# Patient Record
Sex: FEMALE | Race: WHITE | Employment: OTHER
[De-identification: names, ages, dates, MRNs, and addresses within clinical notes are randomized per-mention and may not be internally consistent; named-entity substitution may affect disease eponyms.]

---

## 2016-07-19 LAB — PAP SMEAR - HIM PATIENT REPORTED: NORMAL

## 2017-07-08 ENCOUNTER — HEALTH MAINTENANCE LETTER (OUTPATIENT)
Age: 62
End: 2017-07-08

## 2018-08-25 ENCOUNTER — TRANSFERRED RECORDS (OUTPATIENT)
Dept: HEALTH INFORMATION MANAGEMENT | Facility: CLINIC | Age: 63
End: 2018-08-25

## 2019-01-03 ENCOUNTER — OFFICE VISIT (OUTPATIENT)
Dept: FAMILY MEDICINE | Facility: CLINIC | Age: 64
End: 2019-01-03
Payer: COMMERCIAL

## 2019-01-03 VITALS
SYSTOLIC BLOOD PRESSURE: 130 MMHG | HEART RATE: 72 BPM | OXYGEN SATURATION: 99 % | TEMPERATURE: 98.2 F | WEIGHT: 143.6 LBS | DIASTOLIC BLOOD PRESSURE: 70 MMHG

## 2019-01-03 DIAGNOSIS — B02.9 HERPES ZOSTER WITHOUT COMPLICATION: Primary | ICD-10-CM

## 2019-01-03 DIAGNOSIS — H90.2 CONDUCTIVE HEARING LOSS, UNSPECIFIED LATERALITY: ICD-10-CM

## 2019-01-03 PROCEDURE — 99213 OFFICE O/P EST LOW 20 MIN: CPT | Performed by: NURSE PRACTITIONER

## 2019-01-03 RX ORDER — RIBOFLAVIN (VITAMIN B2) 100 MG
1000 TABLET ORAL DAILY
COMMUNITY
Start: 2014-06-30

## 2019-01-03 RX ORDER — DIPHENOXYLATE HYDROCHLORIDE AND ATROPINE SULFATE 2.5; .025 MG/1; MG/1
1 TABLET ORAL DAILY
COMMUNITY
Start: 2017-06-17

## 2019-01-03 RX ORDER — ATORVASTATIN CALCIUM 40 MG/1
20 TABLET, FILM COATED ORAL DAILY
COMMUNITY
Start: 2018-11-29 | End: 2019-06-19

## 2019-01-03 RX ORDER — FLUOXETINE 10 MG/1
10 CAPSULE ORAL EVERY MORNING
COMMUNITY
Start: 2018-08-20 | End: 2019-12-16

## 2019-01-03 RX ORDER — LOSARTAN POTASSIUM 100 MG/1
100 TABLET ORAL DAILY
COMMUNITY
Start: 2018-10-17 | End: 2019-02-08 | Stop reason: ALTCHOICE

## 2019-01-03 RX ORDER — VALACYCLOVIR HYDROCHLORIDE 1 G/1
1000 TABLET, FILM COATED ORAL 3 TIMES DAILY
Qty: 30 TABLET | Refills: 0 | Status: SHIPPED | OUTPATIENT
Start: 2019-01-03 | End: 2019-02-08

## 2019-01-03 RX ORDER — TRIAMTERENE/HYDROCHLOROTHIAZID 37.5-25 MG
1 TABLET ORAL DAILY
Status: ON HOLD | COMMUNITY
Start: 2018-10-17 | End: 2019-02-04

## 2019-01-03 NOTE — PROGRESS NOTES
SUBJECTIVE:   Mery Sullivan is a 63 year old female who presents to clinic today for the following health issues:    Possible Shingles       Duration: 2-3 days     Description (location/character/radiation): Patient states that she has some pain/tingling    Intensity:  moderate    Accompanying signs and symptoms: none    History (similar episodes/previous evaluation): None    Precipitating or alleviating factors: None    Therapies tried and outcome: None         Problem list and histories reviewed & adjusted, as indicated.  Additional history: as documented    Patient Active Problem List   Diagnosis     Essential hypertension, benign     History reviewed. No pertinent surgical history.    Social History     Tobacco Use     Smoking status: Never Smoker     Smokeless tobacco: Never Used   Substance Use Topics     Alcohol use: Yes     Comment: occ     Family History   Problem Relation Age of Onset     Heart Disease Father         CHF     Hypertension Father      Cerebrovascular Disease Mother      Osteoporosis Mother      Cancer - colorectal Maternal Grandmother      Allergies Sister      Obesity Sister      Obesity Brother          Current Outpatient Medications   Medication Sig Dispense Refill     atorvastatin (LIPITOR) 40 MG tablet Take 40 mg by mouth daily       calcium carbonate 600 mg-vitamin D 400 units (CALTRATE) 600-400 MG-UNIT per tablet Take 1 tablet by mouth 2 times daily       FERROUS GLUCONATE 325 MG OR TABS 1 TABLET DAILY       FLUoxetine (PROZAC) 10 MG capsule Take 10 mg by mouth every morning       Inulin 1.5 g CHEW Take 2 tablets by mouth daily       losartan (COZAAR) 100 MG tablet Take 100 mg by mouth daily       Multiple Vitamin (MULTI-VITAMINS) TABS Take 1 tablet by mouth daily       triamterene-HCTZ (MAXZIDE-25) 37.5-25 MG tablet Take 1 tablet by mouth daily       vitamin C (ASCORBIC ACID) 100 MG tablet Take 100 mg by mouth daily       Allergies   Allergen Reactions     Atenolol      Cold hands  and feet     Dyazide [Hydrochlorothiazide W-Triamterene]      Caused cramps     Lisinopril      Worked for blood pressure but over time caused chest discomfort     Sulfa Drugs Rash     fever     No lab results found.   BP Readings from Last 3 Encounters:   01/03/19 130/70   11/07/06 138/94   07/07/06 118/66    Wt Readings from Last 3 Encounters:   01/03/19 65.1 kg (143 lb 9.6 oz)   11/07/06 64.8 kg (142 lb 12.8 oz)   07/07/06 65 kg (143 lb 6.4 oz)                    Reviewed and updated as needed this visit by clinical staff       Reviewed and updated as needed this visit by Provider         ROS:  Constitutional, HEENT, cardiovascular, pulmonary, gi and gu systems are negative, except as otherwise noted.    OBJECTIVE:     /70 (BP Location: Right arm, Patient Position: Sitting, Cuff Size: Adult Regular)   Pulse 72   Temp 98.2  F (36.8  C) (Tympanic)   Wt 65.1 kg (143 lb 9.6 oz)   SpO2 99%   There is no height or weight on file to calculate BMI.  GENERAL: healthy, alert and no distress  EYES: Eyes grossly normal to inspection, PERRL and conjunctivae and sclerae normal  HENT: ear canals and TM's normal, nose and mouth without ulcers or lesions  HENT:   NECK: no adenopathy, no asymmetry, masses, or scars and thyroid normal to palpation  RESP: lungs clear to auscultation - no rales, rhonchi or wheezes  CV: regular rate and rhythm, normal S1 S2, no S3 or S4, no murmur, click or rub, no peripheral edema and peripheral pulses strong  MS: no gross musculoskeletal defects noted, no edema  SKIN: Examination of the rash reveals: Shingles: one   Inflamed patches of clear fluid-filled vesicles   NEURO: Normal strength and tone, mentation intact and speech normal  PSYCH: mentation appears normal, affect normal/bright        ASSESSMENT/PLAN:   (B02.9) Herpes zoster without complication  (primary encounter diagnosis)  Comment: One small lesion noted by GI does have some pain along the dermatome line concerning for  developing shingles will start patient on Valtrex.  With patient in length the patient gets eye pain lesions on the on the eyelid should be seen by ophthalmology any change in her symptoms should return  Plan: valACYclovir (VALTREX) 1000 mg tablet          (H90.2) Conductive hearing loss, unspecified laterality  Comment:   Plan: AUDIOLOGY ADULT REFERRAL, OTOLARYNGOLOGY         REFERRAL     LATESHA Venegas [facility]

## 2019-01-03 NOTE — NURSING NOTE
"Chief Complaint   Patient presents with     Pain       Initial /70 (BP Location: Right arm, Patient Position: Sitting, Cuff Size: Adult Regular)   Pulse 72   Temp 98.2  F (36.8  C) (Tympanic)   Wt 65.1 kg (143 lb 9.6 oz)   SpO2 99%  Estimated body mass index is 24.51 kg/m  as calculated from the following:    Height as of 11/7/06: 1.626 m (5' 4\").    Weight as of 11/7/06: 64.8 kg (142 lb 12.8 oz).    Patient presents to the clinic using No DME    Health Maintenance that is potentially due pending provider review:  Mammogram, Pap Smear and Colonoscopy/FIT    Done at [facility] .    Is there anyone who you would like to be able to receive your results? No  If yes have patient fill out LONG    Kaylan Cameron CMA    "

## 2019-01-03 NOTE — PATIENT INSTRUCTIONS
Your provider has referred you to: [facility] [phone]         Patient Education     Shingles  Shingles is a viral infection caused by the same virus that causes chicken pox. Anyone who has had chicken pox may get shingles later in life. The virus stays in the body, but remains asleep (dormant). Shingles often occurs in older persons or persons with lowered immunity. But it can affect anyone at any age.  Shingles starts as a tingling patch of skin on one side of the body. Small, painful blisters may then appear. The rash rarely spreads to other parts of the body.  Exposure to shingles can't cause shingles. However, it can cause chicken pox in anyone who has not had chicken pox or has not been vaccinated. The contagious period ends when all blisters have crusted over, generally 1 to 2 weeks after the illness starts.  After the blisters heal, the affected skin may be sensitive or painful for weeks or months, gradually resolving over time. But, sometimes this can last longer and be permanent (called postherpetic neuralgia.)  Shingles vaccines are available. Vaccination can help prevent shingles or make it less painful. It is generally recommended for adults older than 50, even if you've had singles in the past. Talk with your healthcare provider about when to get vaccinated and which vaccine is best for you.  Home care    Medicines may be prescribed to help relieve pain. Take these medicines as directed. Ask your healthcare provider or pharmacist before using over-the-counter medicines for helping treat pain and itching.    In certain cases, antiviral medicines may be prescribed to reduce pain, shorten the illness, and prevent neuralgia. Take these medicines as directed.    Compresses made from a solution of cool water mixed with cornstarch or baking soda may help relieve pain and itching.     Gently wash skin daily with soap and water to help prevent infection. Be certain to rinse  off all of the soap, which can be irritating.    Trim fingernails and try not to scratch. Scratching the sores may leave scars.    Stay home from work or school until all blisters have formed a crust and you are no longer contagious.  Follow-up care  Follow up with your healthcare provider, or as directed.  When to seek medical advice    Fever of 100.4 F (38 C) or higher, or as directed by your healthcare provider    Affected skin is on the face or neck and any of the following occur:  ? Headache  ? Eye pain  ? Changes in vision  ? Sores near the eye  ? Weakness of facial muscles    Blisters occurring on new areas of the body    Pain, redness, or swelling of a joint    Signs of skin infection: colored drainage from the sores, warmth, increasing redness, fever, or increasing pain   Date Last Reviewed: 4/1/2018 2000-2018 [employer]. [address] [place] [address]. All rights reserved. This information is not intended as a substitute for professional medical care. Always follow your healthcare professional's instructions.           Patient Education     Shingles (Herpes Zoster)     Talk to your healthcare provider about the shingles vaccine.     Shingles is also called herpes zoster. It is a painful skin rash caused by the herpes zoster virus. This is the same virus that causes chickenpox. After a person has chickenpox, the virus remains inactive in the nerve cells. Years later, the virus can become active again and travel to the skin. Most people have shingles only once, but it is possible to have it more than once.  What are the risk factors for shingles?  Anyone who has ever had chickenpox can develop shingles. But your risk is greater if you:    Are 50 years of age or older    Have an illness that weakens your immune system, such as HIV/AIDS    Have cancer, especially Hodgkin disease or lymphoma    Take medicines that weaken your immune system  What are the symptoms of shingles?    The  first sign of shingles is usually pain, burning, tingling, or itching on one part of your face or body. You may also feel as if you have the flu, with fever and chills.    A red rash with small blisters appears within a few days. The rash may appear as follows:   ? The blisters can occur anywhere, but they re most common on the back, chest, or abdomen.  ? They usually appear on only one side of the body, spreading along the nerve pathway where the virus was inactive.   ? The rash can also form around an eye, along one side of the face or neck, or in the mouth.  ? In a few people, usually those with weakened immune systems, shingles appear on more than one part of the body at once.    After a few days, the blisters become dry and form a crust. The crust falls off in days to weeks. The blisters generally do not leave scars.  How is shingles treated?  For most people, shingles heals on its own in a few weeks. But treatment is recommended to help relieve pain, speed healing, and reduce the risk of complications. Antiviral medicines are prescribed within the first 72 hours of the appearance of the rash. To lessen symptoms:    Apply ice packs (wrapped in a thin towel) or cool compresses, or soak in a cool bath.    Use calamine lotion to calm itchy skin.    Ask your healthcare provider about over-the-counter pain relievers. If your pain is severe, your healthcare provider may prescribe stronger pain medicines.  What are the complications of shingles?  Shingles often goes away with no lasting effects. But some people have serious problems long after the blisters have healed:    Postherpetic neuralgia. This is the most common complication. It is severe nerve pain at the place where the rash used to be. It can last for months, or even years after you have had shingles. Medicines can be prescribed to help relieve the pain and improve quality of life.    Bacterial infection. Shingles blisters may become infected with bacteria.  Antibiotic medicine is used to treat the infection.    Eye problems. A person with shingles on the face should see his or her healthcare provider right away. Shingles can cause serious problems with vision, and even blindness.  Very rarely shingles can also lead to pneumonia, hearing problems, brain inflammation, or even death.   When to seek medical care  Contact your healthcare provider if you experience any of the following:    Symptoms that don t go away with treatment    A rash or blisters near your eye    Increased drainage, fever, or rash after treatment, or severe pain that doesn t go away   How can shingles be prevented?  You can only get shingles if you have had chickenpox in the past. Those who have never had chickenpox can get the virus from you. Although instead of developing shingles, the person may get chickenpox. Until your blisters form scabs, avoid contact with others, especially the following:    Pregnant women who have never had chickenpox or the vaccine    Infants who were born early (prematurely) or who had low weight at birth    People with weak immune system (for example, people receiving chemotherapy for cancer, people who have had organ transplants, or people with HIV infections)     The shingles vaccine  Shingles vaccines are available to help prevent shingles or make it less painful. Vaccination is recommended for adults 50 and older, even if you've had shingles in the past. Talk with your healthcare provider about the most appropriate time for you to get vaccinated, and which vaccine is best for you.   Date Last Reviewed: 10/1/2016    7931-5831 [employer]. [address] [place] [address], [place] [address]. All rights reserved. This information is not intended as a substitute for professional medical care. Always follow your healthcare professional's instructions.

## 2019-02-02 ENCOUNTER — HOSPITAL ENCOUNTER (EMERGENCY)
Facility: CLINIC | Age: 64
Discharge: HOME | End: 2019-02-02
Attending: EMERGENCY MEDICINE | Admitting: EMERGENCY MEDICINE
Payer: COMMERCIAL

## 2019-02-02 ENCOUNTER — ANESTHESIA EVENT (OUTPATIENT)
Dept: EMERGENCY MEDICINE | Facility: CLINIC | Age: 64
End: 2019-02-02
Payer: COMMERCIAL

## 2019-02-02 ENCOUNTER — APPOINTMENT (OUTPATIENT)
Dept: GENERAL RADIOLOGY | Facility: CLINIC | Age: 64
End: 2019-02-02
Payer: COMMERCIAL

## 2019-02-02 ENCOUNTER — ANESTHESIA (OUTPATIENT)
Dept: EMERGENCY MEDICINE | Facility: CLINIC | Age: 64
End: 2019-02-02
Payer: COMMERCIAL

## 2019-02-02 VITALS
SYSTOLIC BLOOD PRESSURE: 135 MMHG | TEMPERATURE: 98.3 F | RESPIRATION RATE: 7 BRPM | HEART RATE: 71 BPM | DIASTOLIC BLOOD PRESSURE: 69 MMHG | OXYGEN SATURATION: 100 %

## 2019-02-02 DIAGNOSIS — S00.33XA CONTUSION OF NOSE, INITIAL ENCOUNTER: ICD-10-CM
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
## 2019-02-02 DIAGNOSIS — R55 NEAR SYNCOPE: ICD-10-CM

## 2019-02-02 DIAGNOSIS — S42.351A CLOSED DISPLACED COMMINUTED FRACTURE OF SHAFT OF RIGHT HUMERUS, INITIAL ENCOUNTER: ICD-10-CM

## 2019-02-02 DIAGNOSIS — S02.2XXA CLOSED FRACTURE OF NASAL BONE, INITIAL ENCOUNTER: ICD-10-CM

## 2019-02-02 LAB
ALBUMIN SERPL-MCNC: 3.5 G/DL (ref 3.4–5)
ALP SERPL-CCNC: 309 U/L (ref 40–150)
ALT SERPL W P-5'-P-CCNC: 88 U/L (ref 0–50)
ANION GAP SERPL CALCULATED.3IONS-SCNC: 6 MMOL/L (ref 3–14)
AST SERPL W P-5'-P-CCNC: 62 U/L (ref 0–45)
BASOPHILS # BLD AUTO: 0 10E9/L (ref 0–0.2)
BASOPHILS NFR BLD AUTO: 0.4 %
BILIRUB SERPL-MCNC: 0.3 MG/DL (ref 0.2–1.3)
BUN SERPL-MCNC: 20 MG/DL (ref 7–30)
CALCIUM SERPL-MCNC: 7.8 MG/DL (ref 8.5–10.1)
CHLORIDE SERPL-SCNC: 103 MMOL/L (ref 94–109)
CO2 SERPL-SCNC: 25 MMOL/L (ref 20–32)
CREAT SERPL-MCNC: 0.82 MG/DL (ref 0.52–1.04)
DIFFERENTIAL METHOD BLD: ABNORMAL
EOSINOPHIL # BLD AUTO: 0.1 10E9/L (ref 0–0.7)
EOSINOPHIL NFR BLD AUTO: 0.5 %
ERYTHROCYTE [DISTWIDTH] IN BLOOD BY AUTOMATED COUNT: 12.3 % (ref 10–15)
GFR SERPL CREATININE-BSD FRML MDRD: 77 ML/MIN/{1.73_M2}
GLUCOSE SERPL-MCNC: 115 MG/DL (ref 70–99)
HCT VFR BLD AUTO: 27.6 % (ref 35–47)
HGB BLD-MCNC: 9.3 G/DL (ref 11.7–15.7)
IMM GRANULOCYTES # BLD: 0 10E9/L (ref 0–0.4)
IMM GRANULOCYTES NFR BLD: 0.3 %
LYMPHOCYTES # BLD AUTO: 1.6 10E9/L (ref 0.8–5.3)
LYMPHOCYTES NFR BLD AUTO: 14.7 %
MCH RBC QN AUTO: 33.2 PG (ref 26.5–33)
MCHC RBC AUTO-ENTMCNC: 33.7 G/DL (ref 31.5–36.5)
MCV RBC AUTO: 99 FL (ref 78–100)
MONOCYTES # BLD AUTO: 0.9 10E9/L (ref 0–1.3)
MONOCYTES NFR BLD AUTO: 7.7 %
NEUTROPHILS # BLD AUTO: 8.6 10E9/L (ref 1.6–8.3)
NEUTROPHILS NFR BLD AUTO: 76.4 %
NRBC # BLD AUTO: 0 10*3/UL
NRBC BLD AUTO-RTO: 0 /100
PLATELET # BLD AUTO: 261 10E9/L (ref 150–450)
POTASSIUM SERPL-SCNC: 3.4 MMOL/L (ref 3.4–5.3)
PROT SERPL-MCNC: 6.2 G/DL (ref 6.8–8.8)
RBC # BLD AUTO: 2.8 10E12/L (ref 3.8–5.2)
SODIUM SERPL-SCNC: 134 MMOL/L (ref 133–144)
TROPONIN I SERPL-MCNC: <0.015 UG/L (ref 0–0.04)
WBC # BLD AUTO: 11.2 10E9/L (ref 4–11)

## 2019-02-02 PROCEDURE — 24505 CLTX HUMRL SHFT FX W/MNPJ: CPT | Mod: RT | Performed by: EMERGENCY MEDICINE

## 2019-02-02 PROCEDURE — 93005 ELECTROCARDIOGRAM TRACING: CPT | Performed by: EMERGENCY MEDICINE

## 2019-02-02 PROCEDURE — 80053 COMPREHEN METABOLIC PANEL: CPT | Performed by: EMERGENCY MEDICINE

## 2019-02-02 PROCEDURE — 99284 EMERGENCY DEPT VISIT MOD MDM: CPT | Mod: 25 | Performed by: EMERGENCY MEDICINE

## 2019-02-02 PROCEDURE — 40000671 ZZH STATISTIC ANESTHESIA CASE

## 2019-02-02 PROCEDURE — 99285 EMERGENCY DEPT VISIT HI MDM: CPT | Mod: 25 | Performed by: EMERGENCY MEDICINE

## 2019-02-02 PROCEDURE — 84484 ASSAY OF TROPONIN QUANT: CPT | Performed by: EMERGENCY MEDICINE

## 2019-02-02 PROCEDURE — 25000128 H RX IP 250 OP 636: Performed by: NURSE ANESTHETIST, CERTIFIED REGISTERED

## 2019-02-02 PROCEDURE — 73060 X-RAY EXAM OF HUMERUS: CPT | Mod: RT

## 2019-02-02 PROCEDURE — 25000132 ZZH RX MED GY IP 250 OP 250 PS 637: Performed by: EMERGENCY MEDICINE

## 2019-02-02 PROCEDURE — 85025 COMPLETE CBC W/AUTO DIFF WBC: CPT | Performed by: EMERGENCY MEDICINE

## 2019-02-02 PROCEDURE — 93010 ELECTROCARDIOGRAM REPORT: CPT | Mod: Z6 | Performed by: EMERGENCY MEDICINE

## 2019-02-02 PROCEDURE — 24505 CLTX HUMRL SHFT FX W/MNPJ: CPT | Mod: 54 | Performed by: EMERGENCY MEDICINE

## 2019-02-02 PROCEDURE — 40000986 XR HUMERUS RT G/E 2 VW: Mod: RT

## 2019-02-02 PROCEDURE — 25000128 H RX IP 250 OP 636: Performed by: EMERGENCY MEDICINE

## 2019-02-02 PROCEDURE — 70160 X-RAY EXAM OF NASAL BONES: CPT

## 2019-02-02 PROCEDURE — 96376 TX/PRO/DX INJ SAME DRUG ADON: CPT | Performed by: EMERGENCY MEDICINE

## 2019-02-02 PROCEDURE — 96374 THER/PROPH/DIAG INJ IV PUSH: CPT | Performed by: EMERGENCY MEDICINE

## 2019-02-02 PROCEDURE — 37000011 ZZH ANESTHESIA WARD SERVICE: Performed by: NURSE ANESTHETIST, CERTIFIED REGISTERED

## 2019-02-02 RX ORDER — OXYCODONE AND ACETAMINOPHEN 5; 325 MG/1; MG/1
1-2 TABLET ORAL EVERY 4 HOURS PRN
Qty: 20 TABLET | Refills: 0 | Status: SHIPPED | OUTPATIENT
Start: 2019-02-02 | End: 2019-02-08

## 2019-02-02 RX ORDER — OXYCODONE AND ACETAMINOPHEN 5; 325 MG/1; MG/1
2 TABLET ORAL ONCE
Status: COMPLETED | OUTPATIENT
Start: 2019-02-02 | End: 2019-02-02

## 2019-02-02 RX ORDER — PROPOFOL 10 MG/ML
INJECTION, EMULSION INTRAVENOUS PRN
Status: DISCONTINUED | OUTPATIENT
Start: 2019-02-02 | End: 2019-02-02

## 2019-02-02 RX ORDER — HYDROMORPHONE HYDROCHLORIDE 1 MG/ML
0.5 INJECTION, SOLUTION INTRAMUSCULAR; INTRAVENOUS; SUBCUTANEOUS ONCE
Status: COMPLETED | OUTPATIENT
Start: 2019-02-02 | End: 2019-02-02

## 2019-02-02 RX ADMIN — HYDROMORPHONE HYDROCHLORIDE 0.5 MG: 1 INJECTION, SOLUTION INTRAMUSCULAR; INTRAVENOUS; SUBCUTANEOUS at 20:51

## 2019-02-02 RX ADMIN — PROPOFOL 50 MG: 10 INJECTION, EMULSION INTRAVENOUS at 21:10

## 2019-02-02 RX ADMIN — OXYCODONE HYDROCHLORIDE AND ACETAMINOPHEN 1 TABLET: 5; 325 TABLET ORAL at 21:44

## 2019-02-02 RX ADMIN — MIDAZOLAM 2 MG: 1 INJECTION INTRAMUSCULAR; INTRAVENOUS at 21:06

## 2019-02-02 RX ADMIN — HYDROMORPHONE HYDROCHLORIDE 0.5 MG: 1 INJECTION, SOLUTION INTRAMUSCULAR; INTRAVENOUS; SUBCUTANEOUS at 20:02

## 2019-02-02 ASSESSMENT — ENCOUNTER SYMPTOMS
NAUSEA: 0
HEADACHES: 0
LIGHT-HEADEDNESS: 1
ABDOMINAL PAIN: 0
MYALGIAS: 1
BACK PAIN: 0

## 2019-02-02 NOTE — ED AVS SNAPSHOT
[facility]  [address] [address]  Phone:  [phone]  Fax:  [phone]                                    Mery Sullivan   MRN: [id number]    Department:  [facility]   Date of Visit:  2/2/2019           After Visit Summary Signature Page    I have received my discharge instructions, and my questions have been answered. I have discussed any challenges I see with this plan with the nurse or doctor.    ..........................................................................................................................................  Patient/Patient Representative Signature      ..........................................................................................................................................  Patient Representative Print Name and Relationship to Patient    ..................................................               ................................................  Date                                   Time    ..........................................................................................................................................  Reviewed by Signature/Title    ...................................................              ..............................................  Date                                               Time          22EPIC Rev 08/18

## 2019-02-03 ENCOUNTER — HOSPITAL ENCOUNTER (OUTPATIENT)
Facility: CLINIC | Age: 64
Setting detail: OBSERVATION
Discharge: HOME | End: 2019-02-04
Attending: EMERGENCY MEDICINE | Admitting: FAMILY MEDICINE
Payer: COMMERCIAL

## 2019-02-03 ENCOUNTER — NURSE TRIAGE (OUTPATIENT)
Dept: NURSING | Facility: CLINIC | Age: 64
End: 2019-02-03

## 2019-02-03 DIAGNOSIS — R55 NEAR SYNCOPE: ICD-10-CM

## 2019-02-03 PROBLEM — R76.11 MANTOUX: POSITIVE, TREATED: Status: ACTIVE | Noted: 2018-08-21

## 2019-02-03 LAB
ANION GAP SERPL CALCULATED.3IONS-SCNC: 5 MMOL/L (ref 3–14)
BASOPHILS # BLD AUTO: 0 10E9/L (ref 0–0.2)
BASOPHILS NFR BLD AUTO: 0.5 %
BUN SERPL-MCNC: 15 MG/DL (ref 7–30)
CALCIUM SERPL-MCNC: 8.3 MG/DL (ref 8.5–10.1)
CHLORIDE SERPL-SCNC: 100 MMOL/L (ref 94–109)
CO2 SERPL-SCNC: 28 MMOL/L (ref 20–32)
CREAT SERPL-MCNC: 0.93 MG/DL (ref 0.52–1.04)
DIFFERENTIAL METHOD BLD: ABNORMAL
EOSINOPHIL # BLD AUTO: 0.1 10E9/L (ref 0–0.7)
EOSINOPHIL NFR BLD AUTO: 1.4 %
ERYTHROCYTE [DISTWIDTH] IN BLOOD BY AUTOMATED COUNT: 12.5 % (ref 10–15)
GFR SERPL CREATININE-BSD FRML MDRD: 66 ML/MIN/{1.73_M2}
GLUCOSE SERPL-MCNC: 116 MG/DL (ref 70–99)
HCT VFR BLD AUTO: 26.6 % (ref 35–47)
HGB BLD-MCNC: 8.8 G/DL (ref 11.7–15.7)
IMM GRANULOCYTES # BLD: 0 10E9/L (ref 0–0.4)
IMM GRANULOCYTES NFR BLD: 0.2 %
LYMPHOCYTES # BLD AUTO: 1.9 10E9/L (ref 0.8–5.3)
LYMPHOCYTES NFR BLD AUTO: 29.7 %
MCH RBC QN AUTO: 33 PG (ref 26.5–33)
MCHC RBC AUTO-ENTMCNC: 33.1 G/DL (ref 31.5–36.5)
MCV RBC AUTO: 100 FL (ref 78–100)
MONOCYTES # BLD AUTO: 0.8 10E9/L (ref 0–1.3)
MONOCYTES NFR BLD AUTO: 12.3 %
NEUTROPHILS # BLD AUTO: 3.6 10E9/L (ref 1.6–8.3)
NEUTROPHILS NFR BLD AUTO: 55.9 %
NRBC # BLD AUTO: 0 10*3/UL
NRBC BLD AUTO-RTO: 0 /100
PLATELET # BLD AUTO: 237 10E9/L (ref 150–450)
POTASSIUM SERPL-SCNC: 3.5 MMOL/L (ref 3.4–5.3)
RBC # BLD AUTO: 2.67 10E12/L (ref 3.8–5.2)
SODIUM SERPL-SCNC: 133 MMOL/L (ref 133–144)
TROPONIN I SERPL-MCNC: <0.015 UG/L (ref 0–0.04)
TSH SERPL DL<=0.005 MIU/L-ACNC: 1.91 MU/L (ref 0.4–4)
WBC # BLD AUTO: 6.4 10E9/L (ref 4–11)

## 2019-02-03 PROCEDURE — G0378 HOSPITAL OBSERVATION PER HR: HCPCS

## 2019-02-03 PROCEDURE — 93010 ELECTROCARDIOGRAM REPORT: CPT | Mod: Z6 | Performed by: EMERGENCY MEDICINE

## 2019-02-03 PROCEDURE — 25000132 ZZH RX MED GY IP 250 OP 250 PS 637: Performed by: EMERGENCY MEDICINE

## 2019-02-03 PROCEDURE — 84443 ASSAY THYROID STIM HORMONE: CPT | Performed by: EMERGENCY MEDICINE

## 2019-02-03 PROCEDURE — 84484 ASSAY OF TROPONIN QUANT: CPT | Performed by: EMERGENCY MEDICINE

## 2019-02-03 PROCEDURE — 85025 COMPLETE CBC W/AUTO DIFF WBC: CPT | Performed by: EMERGENCY MEDICINE

## 2019-02-03 PROCEDURE — 99220 ZZC INITIAL OBSERVATION CARE,LEVL III: CPT | Performed by: PHYSICIAN ASSISTANT

## 2019-02-03 PROCEDURE — 99285 EMERGENCY DEPT VISIT HI MDM: CPT | Mod: 25

## 2019-02-03 PROCEDURE — 80048 BASIC METABOLIC PNL TOTAL CA: CPT | Performed by: EMERGENCY MEDICINE

## 2019-02-03 PROCEDURE — 25000132 ZZH RX MED GY IP 250 OP 250 PS 637: Performed by: PHYSICIAN ASSISTANT

## 2019-02-03 PROCEDURE — 93005 ELECTROCARDIOGRAM TRACING: CPT

## 2019-02-03 PROCEDURE — 99285 EMERGENCY DEPT VISIT HI MDM: CPT | Mod: 25 | Performed by: EMERGENCY MEDICINE

## 2019-02-03 RX ORDER — ATORVASTATIN CALCIUM 20 MG/1
40 TABLET, FILM COATED ORAL EVERY EVENING
Status: DISCONTINUED | OUTPATIENT
Start: 2019-02-04 | End: 2019-02-04 | Stop reason: HOSPADM

## 2019-02-03 RX ORDER — ACETAMINOPHEN 325 MG/1
650 TABLET ORAL EVERY 4 HOURS PRN
Status: DISCONTINUED | OUTPATIENT
Start: 2019-02-03 | End: 2019-02-04 | Stop reason: HOSPADM

## 2019-02-03 RX ORDER — LIDOCAINE 40 MG/G
CREAM TOPICAL
Status: DISCONTINUED | OUTPATIENT
Start: 2019-02-03 | End: 2019-02-04 | Stop reason: HOSPADM

## 2019-02-03 RX ORDER — IBUPROFEN 600 MG/1
600 TABLET, FILM COATED ORAL EVERY 6 HOURS PRN
Status: DISCONTINUED | OUTPATIENT
Start: 2019-02-03 | End: 2019-02-04 | Stop reason: HOSPADM

## 2019-02-03 RX ORDER — LOSARTAN POTASSIUM 50 MG/1
100 TABLET ORAL DAILY
Status: DISCONTINUED | OUTPATIENT
Start: 2019-02-04 | End: 2019-02-04 | Stop reason: HOSPADM

## 2019-02-03 RX ORDER — OXYCODONE HYDROCHLORIDE 5 MG/1
5 TABLET ORAL EVERY 4 HOURS PRN
Status: DISCONTINUED | OUTPATIENT
Start: 2019-02-03 | End: 2019-02-04 | Stop reason: HOSPADM

## 2019-02-03 RX ORDER — PROCHLORPERAZINE MALEATE 10 MG
10 TABLET ORAL EVERY 6 HOURS PRN
Status: DISCONTINUED | OUTPATIENT
Start: 2019-02-03 | End: 2019-02-04 | Stop reason: HOSPADM

## 2019-02-03 RX ORDER — FLUOXETINE 10 MG/1
10 CAPSULE ORAL EVERY MORNING
Status: DISCONTINUED | OUTPATIENT
Start: 2019-02-04 | End: 2019-02-04 | Stop reason: HOSPADM

## 2019-02-03 RX ORDER — ONDANSETRON 2 MG/ML
4 INJECTION INTRAMUSCULAR; INTRAVENOUS EVERY 6 HOURS PRN
Status: DISCONTINUED | OUTPATIENT
Start: 2019-02-03 | End: 2019-02-04 | Stop reason: HOSPADM

## 2019-02-03 RX ORDER — NALOXONE HYDROCHLORIDE 0.4 MG/ML
.1-.4 INJECTION, SOLUTION INTRAMUSCULAR; INTRAVENOUS; SUBCUTANEOUS
Status: DISCONTINUED | OUTPATIENT
Start: 2019-02-03 | End: 2019-02-04 | Stop reason: HOSPADM

## 2019-02-03 RX ORDER — PROCHLORPERAZINE 25 MG
25 SUPPOSITORY, RECTAL RECTAL EVERY 12 HOURS PRN
Status: DISCONTINUED | OUTPATIENT
Start: 2019-02-03 | End: 2019-02-04 | Stop reason: HOSPADM

## 2019-02-03 RX ORDER — ONDANSETRON 4 MG/1
4 TABLET, ORALLY DISINTEGRATING ORAL EVERY 6 HOURS PRN
Status: DISCONTINUED | OUTPATIENT
Start: 2019-02-03 | End: 2019-02-04 | Stop reason: HOSPADM

## 2019-02-03 RX ORDER — IBUPROFEN 600 MG/1
600 TABLET, FILM COATED ORAL ONCE
Status: DISCONTINUED | OUTPATIENT
Start: 2019-02-03 | End: 2019-02-03

## 2019-02-03 RX ADMIN — ACETAMINOPHEN 650 MG: 325 TABLET, FILM COATED ORAL at 21:42

## 2019-02-03 RX ADMIN — IBUPROFEN 600 MG: 400 TABLET ORAL at 18:00

## 2019-02-03 RX ADMIN — OXYCODONE HYDROCHLORIDE 5 MG: 5 TABLET ORAL at 21:42

## 2019-02-03 NOTE — ED PROVIDER NOTES
History     Chief Complaint   Patient presents with     Hypotension     pt c/o dizziness and low blood pressure.  pt feels it is medication related.  no cp or soa.  no recent illness or fevers     HPI  Mery Sullivan is a 63 year old female who presents for near syncope.  Patient reports that over the past 2 days she has had 2 episodes of lightheadedness.  Both episodes have came on suddenly.  Yesterday she was standing and fell and ended up having a fracture of her humerus.  Today it started when she was sitting.  She denies any symptoms currently.  At home just prior to arrival this came on very abruptly, they took her blood pressure and was 64/40, and then it resolved on its own without intervention, but she still feels slightly light headed now.  They then brought her here for further evaluation.  She reports that she feels fine now.  She denies fever, chest pain, difficulty breathing, abdominal pain, vomiting, diarrhea, dysuria, or rash.  No recent changes in her medication.  She says she has still been drinking and eating normally.  No recent weight loss.  She denies any blood in her stools.  She has had issues with anemia and is on chronic iron for this.  She started taking triamterene hydrochlorothiazide 4 months ago but stopped taking that last night after this episode.  She says she did not take this this morning and instead took an older prescription of losartan-hydrochlorothiazide.    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     Priority: Medium     Generalized anxiety disorder 06/26/2013     Priority: Medium     Hyperlipidemia 06/26/2013     Priority: Medium      Dysthymic disorder 05/29/2012     Priority: Medium     Essential hypertension, benign 07/07/2006     Priority: Medium     Dyazide gave leg cramps  Lisinopril caused chest discomfort  Atenolol caused cold hands but was effective for BP at low dose  ARBs were only available by brand and pt requests generic for cost so started on calcium channel blocker 11/06          Past Medical History:    Past Medical History:   Diagnosis Date     Anemia, unspecified      Unspecified essential hypertension        Past Surgical History:    No past surgical history on file.    Family History:    Family History   Problem Relation Age of Onset     Heart Disease Father         CHF     Hypertension Father      Cerebrovascular Disease Mother      Osteoporosis Mother      Cancer - colorectal Maternal Grandmother      Allergies Sister      Obesity Sister      Obesity Brother        Social History:  Marital Status:   [2]  Social History     Tobacco Use     Smoking status: Never Smoker     Smokeless tobacco: Never Used   Substance Use Topics     Alcohol use: Yes     Comment: occ     Drug use: No        Medications:      atorvastatin (LIPITOR) 40 MG tablet   calcium carbonate 600 mg-vitamin D 400 units (CALTRATE) 600-400 MG-UNIT per tablet   FERROUS GLUCONATE 325 MG OR TABS   FLUoxetine (PROZAC) 10 MG capsule   Inulin 1.5 g CHEW   losartan (COZAAR) 100 MG tablet   Multiple Vitamin (MULTI-VITAMINS) TABS   oxyCODONE-acetaminophen (PERCOCET) 5-325 MG tablet   triamterene-HCTZ (MAXZIDE-25) 37.5-25 MG tablet   valACYclovir (VALTREX) 1000 mg tablet   vitamin C (ASCORBIC ACID) 100 MG tablet         Review of Systems  Pertinent positives and negatives listed in the HPI, all other systems reviewed and are negative.    Physical Exam   BP: 114/90  Pulse: 90  Heart Rate: 89  Resp: 14  SpO2: 100 %      Physical Exam   Constitutional: She is oriented to person, place, and time. She appears well-developed and well-nourished. She appears distressed.    HENT:   Head: Normocephalic and atraumatic.   Right Ear: External ear normal.   Left Ear: External ear normal.   Nose: Nose normal.   Eyes: Conjunctivae are normal. No scleral icterus.   Neck: Normal range of motion.   Cardiovascular: Normal rate and regular rhythm.   Pulmonary/Chest: Effort normal. No stridor. No respiratory distress.   Abdominal: Soft. She exhibits no distension.   Musculoskeletal:   Splint on right upper arm with swelling of the right hand.   Neurological: She is alert and oriented to person, place, and time.   Skin: Skin is warm and dry. She is not diaphoretic.   Psychiatric: She has a normal mood and affect. Her behavior is normal.   Nursing note and vitals reviewed.      ED Course        Procedures               EKG Interpretation:      Interpreted by Kennedy Lozano  Time reviewed: 1710  Symptoms at time of EKG: Near syncope   Rhythm: normal sinus   Rate: normal  Axis: normal  Ectopy: none  Conduction: normal  ST Segments/ T Waves: No ST-T wave changes  Q Waves: none  Comparison to prior: Unchanged     Clinical Impression: normal EKG          Critical Care time:  none               Results for orders placed or performed during the hospital encounter of 02/03/19 (from the past 24 hour(s))   Troponin I   Result Value Ref Range    Troponin I ES <0.015 0.000 - 0.045 ug/L   Basic metabolic panel   Result Value Ref Range    Sodium 133 133 - 144 mmol/L    Potassium 3.5 3.4 - 5.3 mmol/L    Chloride 100 94 - 109 mmol/L    Carbon Dioxide 28 20 - 32 mmol/L    Anion Gap 5 3 - 14 mmol/L    Glucose 116 (H) 70 - 99 mg/dL    Urea Nitrogen 15 7 - 30 mg/dL    Creatinine 0.93 0.52 - 1.04 mg/dL    GFR Estimate 66 >60 mL/min/[1.73_m2]    GFR Estimate If Black 77 >60 mL/min/[1.73_m2]    Calcium 8.3 (L) 8.5 - 10.1 mg/dL   CBC with platelets differential   Result Value Ref Range    WBC 6.4 4.0 - 11.0 10e9/L    RBC Count 2.67 (L) 3.8 - 5.2 10e12/L    Hemoglobin 8.8 (L) 11.7 - 15.7 g/dL    Hematocrit 26.6 (L)  35.0 - 47.0 %     78 - 100 fl    MCH 33.0 26.5 - 33.0 pg    MCHC 33.1 31.5 - 36.5 g/dL    RDW 12.5 10.0 - 15.0 %    Platelet Count 237 150 - 450 10e9/L    Diff Method Automated Method     % Neutrophils 55.9 %    % Lymphocytes 29.7 %    % Monocytes 12.3 %    % Eosinophils 1.4 %    % Basophils 0.5 %    % Immature Granulocytes 0.2 %    Nucleated RBCs 0 0 /100    Absolute Neutrophil 3.6 1.6 - 8.3 10e9/L    Absolute Lymphocytes 1.9 0.8 - 5.3 10e9/L    Absolute Monocytes 0.8 0.0 - 1.3 10e9/L    Absolute Eosinophils 0.1 0.0 - 0.7 10e9/L    Absolute Basophils 0.0 0.0 - 0.2 10e9/L    Abs Immature Granulocytes 0.0 0 - 0.4 10e9/L    Absolute Nucleated RBC 0.0    TSH with free T4 reflex   Result Value Ref Range    TSH 1.91 0.40 - 4.00 mU/L       Medications   ibuprofen (ADVIL/MOTRIN) tablet 600 mg (600 mg Oral Incomplete 2/3/19 1800)       Assessments & Plan (with Medical Decision Making)   63-year-old female who presents for near syncope and hypotension at home.  Heart rate 79, blood pressure is 123/60, SPO2 is 100% on room air.  She is feeling much better now.  Symptoms were transient.  EKG shows sinus rhythm without signs of dysrhythmia such as WPW, prolonged QT syndrome, or arrhythmogenic right ventricular dysplasia.  She is feeling better now.  Hemoglobin is 8.8, which is low but this is not significantly different from yesterday's value of 9.3.  TSH is normal.  Troponin is normal.  Electrolytes are within normal limits.  The patient remained stable, however with 2 episodes over the past 24 hours it is prudent to admit the patient to the hospital here for close monitoring.  I discussed the case with the on-call hospitalist Taina JUAREZ who agrees to admit the patient to her service.    I have reviewed the nursing notes.    I have reviewed the findings, diagnosis, plan and need for follow up with the patient.          Medication List      There are no discharge medications for this visit.         Final  diagnoses:   Near syncope       2/3/2019   [facility] EMERGENCY DEPARTMENT     Kennedy Lozano MD  02/03/19 0510

## 2019-02-03 NOTE — ED PROVIDER NOTES
"  History     Chief Complaint   Patient presents with     Fall     right Shoulder & Upper arm  injury  Patient  B/G 140   recent change in B/P medications  Patient having  No LOC  CMS right arm  good   20 ga IV        HPI  Mery Sullivan is a 63 year old female who presents to the ED via EMS for evaluation after a fall. The patient states that earlier this evening, she was walking into the bathroom when she experienced an acute onset of lightheadedness. Per her , the patient experienced LOC and fell forward, hitting the wall, and then backward, hitting her right arm on the bathtub. He adds that her eyelids were \"fluttering\" and that she was unresponsive, though she reports that she could hear him. The patient states that she woke up on the tile floor unable to move her right arm. She denies any chest pain or shortness of breath prior to LOC. The patient currently describes a sharp pain in her upper right arm which worsens with movement. No right elbow pain, back pain, chest pain, abdominal pain, leg pain, headache, or nausea. The patient has a drug allergy to sulfas.  Is right-hand dominant.  Denies paresthesias.  Denies previous history of cardiac disease or syncope.      Allergies:  Allergies   Allergen Reactions     Atenolol      Cold hands and feet     Dyazide [Hydrochlorothiazide W-Triamterene]      Caused cramps     Lisinopril      Worked for blood pressure but over time caused chest discomfort     Sulfa Drugs Rash     fever       Problem List:    Patient Active Problem List    Diagnosis Date Noted     Essential hypertension, benign 07/07/2006     Priority: Medium     Dyazide gave leg cramps  Lisinopril caused chest discomfort  Atenolol caused cold hands but was effective for BP at low dose  ARBs were only available by brand and pt requests generic for cost so started on calcium channel blocker 11/06          Past Medical History:    Past Medical History:   Diagnosis Date     Anemia, unspecified      " Unspecified essential hypertension        Past Surgical History:    No past surgical history on file.    Family History:    Family History   Problem Relation Age of Onset     Heart Disease Father         CHF     Hypertension Father      Cerebrovascular Disease Mother      Osteoporosis Mother      Cancer - colorectal Maternal Grandmother      Allergies Sister      Obesity Sister      Obesity Brother        Social History:  Marital Status:   [2]  Social History     Tobacco Use     Smoking status: Never Smoker     Smokeless tobacco: Never Used   Substance Use Topics     Alcohol use: Yes     Comment: occ     Drug use: No        Medications:      atorvastatin (LIPITOR) 40 MG tablet   calcium carbonate 600 mg-vitamin D 400 units (CALTRATE) 600-400 MG-UNIT per tablet   FERROUS GLUCONATE 325 MG OR TABS   FLUoxetine (PROZAC) 10 MG capsule   Inulin 1.5 g CHEW   losartan (COZAAR) 100 MG tablet   oxyCODONE-acetaminophen (PERCOCET) 5-325 MG tablet   triamterene-HCTZ (MAXZIDE-25) 37.5-25 MG tablet   vitamin C (ASCORBIC ACID) 100 MG tablet   Multiple Vitamin (MULTI-VITAMINS) TABS   valACYclovir (VALTREX) 1000 mg tablet         Review of Systems   Cardiovascular: Negative for chest pain.   Gastrointestinal: Negative for abdominal pain and nausea.   Musculoskeletal: Positive for myalgias. Negative for back pain.   Neurological: Positive for light-headedness. Negative for headaches.   All other systems reviewed and are negative.      Physical Exam   BP: 119/63  Pulse: 77  Heart Rate: 82  Temp: 98.3  F (36.8  C)  Resp: 18  SpO2: 98 %      Physical Exam general alert cooperative female in moderate distress.  HEENT reveals bruising and swelling at the base of her nose.  Pupils are equal and reactive.  Extraocular motions are intact.  No persistent nystagmus.  Ears reveal no hemotympanum and there is no graham signs.  No epistasis currently but did have a nosebleed at home.  Dried blood in the left nares.  No septal hematoma.  No  dental trauma malocclusion.  Neck and back are nontender.  Lungs are clear without adventitious sounds.  Cardiac auscultation is normal without murmur.  Her chest and abdomen are nontender.  She has her right arm extended above her head and on palpation there is crepitus and tenderness in the mid humeral area.  The elbow is nontender.  She has intact pulses.  Intact  strength.  Cap refill is normal.  The other extremities are atraumatic.  [place] Coma Scale was 15 except she was unable to move her right upper arm due to the pain.    ED Course        Procedures        X-ray was obtained and showed a displaced midshaft humeral fracture.  With the assistance of anesthetist patient was sedated after consent was signed by her .  Positive the cause in the right arm was identified as the offending arm.  Then the arm was repositioned and distraction at the elbow.  The arm was splinted in sugar tong fashion and thereafter had intact pulses and cap refill.  Repeat x-ray showed improved alignment.          EKG Interpretation:      Interpreted by Nick Frederick  Time reviewed:21:25  Symptoms at time of EKG: Syncopal episode  Rhythm: Normal sinus   Rate: Normal  Axis: Normal  Ectopy: None  Conduction: Normal  ST Segments/ T Waves: No ST-T wave changes and No acute ischemic changes  Q Waves: None  Comparison to prior: No old EKG available    Clinical Impression: normal EKG    Critical Care time:  none               Results for orders placed or performed during the hospital encounter of 02/02/19 (from the past 24 hour(s))   XR Humerus Port Right G/E 2 Views    Narrative    HUMERUS TWO VIEWS RIGHT  2/2/2019 8:23 PM     HISTORY: Fall with humeral injury    COMPARISON: None.      Impression    IMPRESSION: Oblique displaced fracture of the diaphysis of the humerus  at the junction of proximal one third and distal two thirds. There is  a tiny additional fragment, oblique nondisplaced fracture in the  proximal humerus fragment  extending towards the humeral neck.  Posterior displacement of the distal fragment with angulation.    MILAGRO BEAR MD   CBC with platelets, differential   Result Value Ref Range    WBC 11.2 (H) 4.0 - 11.0 10e9/L    RBC Count 2.80 (L) 3.8 - 5.2 10e12/L    Hemoglobin 9.3 (L) 11.7 - 15.7 g/dL    Hematocrit 27.6 (L) 35.0 - 47.0 %    MCV 99 78 - 100 fl    MCH 33.2 (H) 26.5 - 33.0 pg    MCHC 33.7 31.5 - 36.5 g/dL    RDW 12.3 10.0 - 15.0 %    Platelet Count 261 150 - 450 10e9/L    Diff Method Automated Method     % Neutrophils 76.4 %    % Lymphocytes 14.7 %    % Monocytes 7.7 %    % Eosinophils 0.5 %    % Basophils 0.4 %    % Immature Granulocytes 0.3 %    Nucleated RBCs 0 0 /100    Absolute Neutrophil 8.6 (H) 1.6 - 8.3 10e9/L    Absolute Lymphocytes 1.6 0.8 - 5.3 10e9/L    Absolute Monocytes 0.9 0.0 - 1.3 10e9/L    Absolute Eosinophils 0.1 0.0 - 0.7 10e9/L    Absolute Basophils 0.0 0.0 - 0.2 10e9/L    Abs Immature Granulocytes 0.0 0 - 0.4 10e9/L    Absolute Nucleated RBC 0.0    Comprehensive metabolic panel   Result Value Ref Range    Sodium 134 133 - 144 mmol/L    Potassium 3.4 3.4 - 5.3 mmol/L    Chloride 103 94 - 109 mmol/L    Carbon Dioxide 25 20 - 32 mmol/L    Anion Gap 6 3 - 14 mmol/L    Glucose 115 (H) 70 - 99 mg/dL    Urea Nitrogen 20 7 - 30 mg/dL    Creatinine 0.82 0.52 - 1.04 mg/dL    GFR Estimate 77 >60 mL/min/[1.73_m2]    GFR Estimate If Black 89 >60 mL/min/[1.73_m2]    Calcium 7.8 (L) 8.5 - 10.1 mg/dL    Bilirubin Total 0.3 0.2 - 1.3 mg/dL    Albumin 3.5 3.4 - 5.0 g/dL    Protein Total 6.2 (L) 6.8 - 8.8 g/dL    Alkaline Phosphatase 309 (H) 40 - 150 U/L    ALT 88 (H) 0 - 50 U/L    AST 62 (H) 0 - 45 U/L   Troponin I   Result Value Ref Range    Troponin I ES <0.015 0.000 - 0.045 ug/L   XR Humerus Right G/E 2 Views    Narrative    HUMERUS TWO VIEWS RIGHT  2/2/2019 10:27 PM     HISTORY: Reduction    COMPARISON: Right humerus 2/2/2019 2004 hours      Impression    IMPRESSION: Oblique fracture through the  "proximal diaphysis of the  humerus is redemonstrated. Previous angulation is resolved. There is  mild medial displacement of the distal fragment in the frontal  projection.    MILAGRO BEAR MD   XR Nasal Bones 3 Views    Narrative    X-RAY NASAL BONES 3 VIEWS 2/2/2019 10:30 PM    HISTORY: Fall with nasal trauma and epistasis    COMPARISON: None      Impression    IMPRESSION:   Nondisplaced nasal bone fracture.    MILAGRO BEAR MD       Medications   HYDROmorphone (PF) (DILAUDID) injection 0.5 mg (0.5 mg Intravenous Given 2/2/19 2002)   HYDROmorphone (PF) (DILAUDID) injection 0.5 mg (0.5 mg Intravenous Given 2/2/19 2051)   oxyCODONE-acetaminophen (PERCOCET) 5-325 MG per tablet 2 tablet (1 tablet Oral Given 2/2/19 2144)       7:55 PM Patient assessed.    IV was established and blood work was obtained.  EKG is ordered.  X-ray of the arm is ordered and showed displaced fracture noted above.  She was given Dilaudid for pain prior to x-ray.  With the assistance of anesthesia a reduction was performed as noted above.  Please see their note.  Assessments & Plan (with Medical Decision Making)   Mery Sullivan is a 63 year old female who presents to the ED via EMS for evaluation after a fall. The patient states that earlier this evening, she was walking into the bathroom when she experienced an acute onset of lightheadedness. Per her , the patient experienced LOC and fell forward, hitting the wall, and then backward, hitting her right arm on the bathtub. He adds that her eyelids were \"fluttering\" and that she was unresponsive, though she reports that she could hear him. The patient states that she woke up on the tile floor unable to move her right arm. She denies any chest pain or shortness of breath prior to LOC. The patient currently describes a sharp pain in her upper right arm which worsens with movement. No right elbow pain, back pain, chest pain, abdominal pain, leg pain, headache, or nausea. The patient has a " drug allergy to sulfas.  Is right-hand dominant.  Denies paresthesias.  Denies previous history of cardiac disease or syncope.  On presentation patient was in moderate pain.  She had her right arm lifted above her head and refuses to move due to discomfort.  Head and neck were atraumatic except nasal bruising and swelling with dried blood in the nares.  No hemotympanum or graham sign.  No raccoon's eyes.  Pupils were equal and react light accommodation.  Extraocular motions are intact.  Neck and back are nontender.  Chest and abdomen nontender.  Palpation of her humerus there was crepitus and deformity of the mid humerus.  Elbow and wrist were unaffected.  Intact pulses and sensation distally.  She had normal respiratory and cardiac auscultation.  EKG showed no acute abnormality.  No old EKG for comparison.  On cardiac monitoring she had no ectopy or problems throughout the ER stay.  Blood work was obtained as noted above.  She had a displaced midshaft humeral fracture.  With the assistance of anesthesia for sedation this was reduced and re-x-ray showed improved alignment..  Nasal x-ray showed nondisplaced fracture.  No air-fluid levels in the sinus that would can concerning for blood.  Orthopedic referral is provided.  Ibuprofen or Percocet for pain.  Sling and splint until follow-up.  Handout on humeral fracture and on syncope are provided.  Reasons to return to the emergency room are discussed.  I have reviewed the nursing notes.    I have reviewed the findings, diagnosis, plan and need for follow up with the patient.          Medication List      Started    oxyCODONE-acetaminophen 5-325 MG tablet  Commonly known as:  PERCOCET  1-2 tablets, Oral, EVERY 4 HOURS PRN            Final diagnoses:   Fall, initial encounter   Closed displaced comminuted fracture of shaft of right humerus, initial encounter   Contusion of nose, initial encounter   Near syncope   Closed fracture of nasal bone, initial encounter       This  document serves as a record of the services and decisions personally performed and made by Nick Frederick MD. It was created on HIS/HER behalf by Isa Reyes, a trained medical scribe. The creation of this document is based the provider's statements to the medical scribe.  Isa Reyes 8:25 PM 2/2/2019    Provider:   The information in this document, created by the medical scribe for me, accurately reflects the services I personally performed and the decisions made by me. I have reviewed and approved this document for accuracy prior to leaving the patient care area.  Nick Frederick MD 8:25 PM 2/2/2019 2/2/2019   [facility] EMERGENCY DEPARTMENT     Nick Frederick MD  02/02/19 2242       Nick Frederick MD  02/02/19 2247

## 2019-02-03 NOTE — ED NOTES
Post splint and sedation. pt is awake and talking. Sling applied. VSS. Will monitor. Family At   bedside.

## 2019-02-03 NOTE — ED TRIAGE NOTES
Patient walking into bathroom with laundry & got dizzy/lightheaded & fell sideways into wall & went backwards & hit tub and floor. Per  she was slow to respond, eyes fluttering for a few seconds before she came too. Arrives with dried blood to left nostril, denies facial/nose pain. Has severe pain to upper right arm with any movement, + bruising, cms intact. States her MD has been changing her BP meds and she has felt dizzy for past few weeks since then. Med list updated

## 2019-02-03 NOTE — ED TRIAGE NOTES
pt c/o dizziness and low blood pressure.  pt feels it is medication related.  no cp or soa.  no recent illness or fevers.  Pt has had recent change in BP meds.

## 2019-02-03 NOTE — DISCHARGE INSTRUCTIONS
The orthopedic clinic should contact you to arrange follow-up with the orthopedic surgeon.  Ice for swelling.  Splint and sling until follow-up.  Ibuprofen or Percocet for pain.  If uncontrolled pain, discoloration of the hand, or increased weakness of the hand return to the emergency room for reassessment.

## 2019-02-03 NOTE — ANESTHESIA POSTPROCEDURE EVALUATION
Patient: Mery Sullivan    * No procedures listed *    Diagnosis:* No pre-op diagnosis entered *  Diagnosis Additional Information: No value filed.    Anesthesia Type:  MAC    Note:  Anesthesia Post Evaluation    Patient location during evaluation: ED  Patient participation: Able to fully participate in evaluation  Level of consciousness: awake  Pain management: adequate  Airway patency: patent  Cardiovascular status: acceptable  Respiratory status: acceptable  Hydration status: stable  PONV: none     Anesthetic complications: None          Last vitals:  Vitals:    02/02/19 2030 02/02/19 2045 02/02/19 2100   BP: 122/70 124/67 120/65   Pulse: 70 75 81   Resp: 9 10 10   Temp:      SpO2: 100% 100% 100%         Electronically Signed By: LATESHA Contreras CRNA  February 2, 2019  9:16 PM

## 2019-02-03 NOTE — TELEPHONE ENCOUNTER
"Spouse calling; patient present.  Patient at ED yesterday and treated for right upper arm fracture after a fall.  Spouse states patient's blood pressure keeps dropping and is now 66/42.  Spouse handed phone to patient who states she was lightheaded earlier.  States has adjusted doses of some of her medications.  Advised to go to ED now.  Patient questioning if low blood pressure is due to medications peaking.  FNA again recommended ED visit now.     Reason for Disposition    [1] Systolic BP < 80 AND [2] NOT dizzy, lightheaded or weak    Additional Information    Negative: Started suddenly after an allergic medicine, an allergic food, or bee sting    Negative: Shock suspected (e.g., cold/pale/clammy skin, too weak to stand, low BP, rapid pulse)    Negative: Difficult to awaken or acting confused  (e.g., disoriented, slurred speech)    Negative: Fainted    Negative: [1] Systolic BP < 90 AND [2] dizzy, lightheaded, or weak    Negative: Chest pain    Negative: Bleeding (e.g., vomiting blood, rectal bleeding or tarry stools, severe vaginal bleeding)(Exception: fainted from sight of small amount of blood; small cut or abrasion)    Negative: Extra heart beats or heart is beating fast  (i.e., \"palpitations\")    Negative: Sounds like a life-threatening emergency to the triager    Protocols used: LOW BLOOD PRESSURE-ADULT-AH      "

## 2019-02-03 NOTE — ANESTHESIA PREPROCEDURE EVALUATION
"Anesthesia Pre-Procedure Evaluation    Patient: Mery Sullivan   MRN: [id number] : 1955          Preoperative Diagnosis: * No surgery found *        Past Medical History:   Diagnosis Date     Anemia, unspecified      Unspecified essential hypertension      No past surgical history on file.    Anesthesia Evaluation     .             ROS/MED HX    ENT/Pulmonary:  - neg pulmonary ROS     Neurologic:  - neg neurologic ROS     Cardiovascular:     (+) Dyslipidemia, hypertension----. : . . . :. .       METS/Exercise Tolerance:     Hematologic:  - neg hematologic  ROS       Musculoskeletal:  - neg musculoskeletal ROS       GI/Hepatic:  - neg GI/hepatic ROS       Renal/Genitourinary:  - ROS Renal section negative       Endo:  - neg endo ROS       Psychiatric:         Infectious Disease:  - neg infectious disease ROS       Malignancy:      - no malignancy   Other:    - neg other ROS                      Physical Exam  Normal systems: cardiovascular, pulmonary and dental    Airway   Mallampati: II  TM distance: >3 FB  Neck ROM: full    Dental     Cardiovascular       Pulmonary             Lab Results   Component Value Date    WBC 4.7 2005    HGB 12.7 2006    HCT 33.7 (L) 2005     2005     2004    POTASSIUM 4.5 2004    CHLORIDE 107 2004    CO2 27 2004    BUN 20 2004    CR 0.97 2004    GLC 90 2004    RADHA 8.7 2004    TSH 1.48 2004       Preop Vitals  BP Readings from Last 3 Encounters:   19 104/67   19 130/70   06 138/94    Pulse Readings from Last 3 Encounters:   19 68   19 72   06 84      Resp Readings from Last 3 Encounters:   19 9   06 16    SpO2 Readings from Last 3 Encounters:   19 95%   19 99%      Temp Readings from Last 1 Encounters:   19 36.8  C (98.3  F) (Oral)    Ht Readings from Last 1 Encounters:   06 1.626 m (5' 4\")      Wt Readings from Last 1 " "Encounters:   01/03/19 65.1 kg (143 lb 9.6 oz)    Estimated body mass index is 24.51 kg/m  as calculated from the following:    Height as of 11/7/06: 1.626 m (5' 4\").    Weight as of 11/7/06: 64.8 kg (142 lb 12.8 oz).       Anesthesia Plan      History & Physical Review  History and physical reviewed and following examination; no interval change.    ASA Status:  2 .    NPO Status:  > 4 hours    Plan for MAC Reason for MAC:  Other - see comments  PONV prophylaxis:  Ondansetron (or other 5HT-3) and Dexamethasone or Solumedrol       Postoperative Care  Postoperative pain management:  IV analgesics and Oral pain medications.      Consents  Anesthetic plan, risks, benefits and alternatives discussed with:  Patient..                 LATESHA Contreras CRNA  "

## 2019-02-04 ENCOUNTER — HOSPITAL ENCOUNTER (OUTPATIENT)
Dept: CARDIOLOGY | Facility: CLINIC | Age: 64
End: 2019-02-04
Attending: NURSE PRACTITIONER
Payer: COMMERCIAL

## 2019-02-04 VITALS
OXYGEN SATURATION: 99 % | SYSTOLIC BLOOD PRESSURE: 122 MMHG | RESPIRATION RATE: 18 BRPM | DIASTOLIC BLOOD PRESSURE: 68 MMHG | TEMPERATURE: 98 F | WEIGHT: 148.59 LBS | HEART RATE: 80 BPM

## 2019-02-04 DIAGNOSIS — R55 NEAR SYNCOPE: ICD-10-CM

## 2019-02-04 LAB
ANION GAP SERPL CALCULATED.3IONS-SCNC: 5 MMOL/L (ref 3–14)
BASOPHILS # BLD AUTO: 0.1 10E9/L (ref 0–0.2)
BASOPHILS NFR BLD AUTO: 0.8 %
BUN SERPL-MCNC: 11 MG/DL (ref 7–30)
CALCIUM SERPL-MCNC: 8.1 MG/DL (ref 8.5–10.1)
CHLORIDE SERPL-SCNC: 100 MMOL/L (ref 94–109)
CO2 SERPL-SCNC: 29 MMOL/L (ref 20–32)
CORTICOSTER 1H P 250 UG ACTH SERPL-SCNC: 2.7 UG/DL
CREAT SERPL-MCNC: 0.9 MG/DL (ref 0.52–1.04)
DIFFERENTIAL METHOD BLD: ABNORMAL
EOSINOPHIL # BLD AUTO: 0.1 10E9/L (ref 0–0.7)
EOSINOPHIL NFR BLD AUTO: 1.6 %
ERYTHROCYTE [DISTWIDTH] IN BLOOD BY AUTOMATED COUNT: 12.4 % (ref 10–15)
ERYTHROCYTE [DISTWIDTH] IN BLOOD BY AUTOMATED COUNT: 12.5 % (ref 10–15)
FERRITIN SERPL-MCNC: 272 NG/ML (ref 8–252)
FOLATE SERPL-MCNC: 26.6 NG/ML
GFR SERPL CREATININE-BSD FRML MDRD: 68 ML/MIN/{1.73_M2}
GLUCOSE SERPL-MCNC: 93 MG/DL (ref 70–99)
HCT VFR BLD AUTO: 26.2 % (ref 35–47)
HCT VFR BLD AUTO: 29.9 % (ref 35–47)
HEMOCCULT STL QL: NEGATIVE
HGB BLD-MCNC: 8.7 G/DL (ref 11.7–15.7)
HGB BLD-MCNC: 9.6 G/DL (ref 11.7–15.7)
IMM GRANULOCYTES # BLD: 0 10E9/L (ref 0–0.4)
IMM GRANULOCYTES NFR BLD: 0.3 %
IRON SATN MFR SERPL: 19 % (ref 15–46)
IRON SERPL-MCNC: 52 UG/DL (ref 35–180)
LYMPHOCYTES # BLD AUTO: 2.1 10E9/L (ref 0.8–5.3)
LYMPHOCYTES NFR BLD AUTO: 34.7 %
MCH RBC QN AUTO: 33.3 PG (ref 26.5–33)
MCH RBC QN AUTO: 33.4 PG (ref 26.5–33)
MCHC RBC AUTO-ENTMCNC: 32.1 G/DL (ref 31.5–36.5)
MCHC RBC AUTO-ENTMCNC: 33.2 G/DL (ref 31.5–36.5)
MCV RBC AUTO: 100 FL (ref 78–100)
MCV RBC AUTO: 104 FL (ref 78–100)
MONOCYTES # BLD AUTO: 0.6 10E9/L (ref 0–1.3)
MONOCYTES NFR BLD AUTO: 9 %
NEUTROPHILS # BLD AUTO: 3.3 10E9/L (ref 1.6–8.3)
NEUTROPHILS NFR BLD AUTO: 53.6 %
NRBC # BLD AUTO: 0 10*3/UL
NRBC BLD AUTO-RTO: 0 /100
PLATELET # BLD AUTO: 216 10E9/L (ref 150–450)
PLATELET # BLD AUTO: 265 10E9/L (ref 150–450)
POTASSIUM SERPL-SCNC: 3.8 MMOL/L (ref 3.4–5.3)
RBC # BLD AUTO: 2.61 10E12/L (ref 3.8–5.2)
RBC # BLD AUTO: 2.87 10E12/L (ref 3.8–5.2)
RETICS # AUTO: 27.6 10E9/L (ref 25–95)
RETICS/RBC NFR AUTO: 1 % (ref 0.5–2)
SODIUM SERPL-SCNC: 134 MMOL/L (ref 133–144)
TIBC SERPL-MCNC: 275 UG/DL (ref 240–430)
VIT B12 SERPL-MCNC: 637 PG/ML (ref 193–986)
WBC # BLD AUTO: 5.1 10E9/L (ref 4–11)
WBC # BLD AUTO: 6.1 10E9/L (ref 4–11)

## 2019-02-04 PROCEDURE — 85045 AUTOMATED RETICULOCYTE COUNT: CPT | Performed by: FAMILY MEDICINE

## 2019-02-04 PROCEDURE — 99217 ZZC OBSERVATION CARE DISCHARGE: CPT | Performed by: FAMILY MEDICINE

## 2019-02-04 PROCEDURE — 83550 IRON BINDING TEST: CPT | Performed by: FAMILY MEDICINE

## 2019-02-04 PROCEDURE — 82272 OCCULT BLD FECES 1-3 TESTS: CPT | Performed by: PHYSICIAN ASSISTANT

## 2019-02-04 PROCEDURE — 80048 BASIC METABOLIC PNL TOTAL CA: CPT | Performed by: PHYSICIAN ASSISTANT

## 2019-02-04 PROCEDURE — G0378 HOSPITAL OBSERVATION PER HR: HCPCS

## 2019-02-04 PROCEDURE — 0296T ZIO PATCH HOLTER ADULT PEDIATRIC GREATER THAN 48 HRS: CPT

## 2019-02-04 PROCEDURE — 82607 VITAMIN B-12: CPT | Performed by: FAMILY MEDICINE

## 2019-02-04 PROCEDURE — 25000132 ZZH RX MED GY IP 250 OP 250 PS 637: Performed by: PHYSICIAN ASSISTANT

## 2019-02-04 PROCEDURE — 85027 COMPLETE CBC AUTOMATED: CPT | Mod: 91 | Performed by: PHYSICIAN ASSISTANT

## 2019-02-04 PROCEDURE — 82746 ASSAY OF FOLIC ACID SERUM: CPT | Performed by: FAMILY MEDICINE

## 2019-02-04 PROCEDURE — 83540 ASSAY OF IRON: CPT | Performed by: FAMILY MEDICINE

## 2019-02-04 PROCEDURE — 85025 COMPLETE CBC W/AUTO DIFF WBC: CPT | Performed by: FAMILY MEDICINE

## 2019-02-04 PROCEDURE — 93306 TTE W/DOPPLER COMPLETE: CPT

## 2019-02-04 PROCEDURE — 82728 ASSAY OF FERRITIN: CPT | Performed by: FAMILY MEDICINE

## 2019-02-04 PROCEDURE — 40000847 ZZHCL STATISTIC MORPHOLOGY W/INTERP HISTOLOGY TC 85060: Performed by: FAMILY MEDICINE

## 2019-02-04 PROCEDURE — 82533 TOTAL CORTISOL: CPT | Performed by: PHYSICIAN ASSISTANT

## 2019-02-04 PROCEDURE — 36415 COLL VENOUS BLD VENIPUNCTURE: CPT | Performed by: FAMILY MEDICINE

## 2019-02-04 PROCEDURE — 85060 BLOOD SMEAR INTERPRETATION: CPT | Performed by: FAMILY MEDICINE

## 2019-02-04 PROCEDURE — 0298T ZZC EXT ECG > 48HR TO 21 DAY REVIEW AND INTERPRETATN: CPT | Performed by: INTERNAL MEDICINE

## 2019-02-04 PROCEDURE — 93306 TTE W/DOPPLER COMPLETE: CPT | Mod: 26 | Performed by: INTERNAL MEDICINE

## 2019-02-04 PROCEDURE — 36415 COLL VENOUS BLD VENIPUNCTURE: CPT | Performed by: PHYSICIAN ASSISTANT

## 2019-02-04 PROCEDURE — 25000132 ZZH RX MED GY IP 250 OP 250 PS 637: Performed by: EMERGENCY MEDICINE

## 2019-02-04 RX ADMIN — ACETAMINOPHEN 650 MG: 325 TABLET, FILM COATED ORAL at 02:00

## 2019-02-04 RX ADMIN — IBUPROFEN 600 MG: 600 TABLET ORAL at 12:58

## 2019-02-04 RX ADMIN — FLUOXETINE 10 MG: 10 CAPSULE ORAL at 08:44

## 2019-02-04 RX ADMIN — LOSARTAN POTASSIUM 100 MG: 50 TABLET ORAL at 08:44

## 2019-02-04 RX ADMIN — OXYCODONE HYDROCHLORIDE 5 MG: 5 TABLET ORAL at 05:59

## 2019-02-04 RX ADMIN — OXYCODONE HYDROCHLORIDE 5 MG: 5 TABLET ORAL at 02:00

## 2019-02-04 RX ADMIN — ACETAMINOPHEN 650 MG: 325 TABLET, FILM COATED ORAL at 05:59

## 2019-02-04 RX ADMIN — OXYCODONE HYDROCHLORIDE 5 MG: 5 TABLET ORAL at 12:54

## 2019-02-04 NOTE — PHARMACY - DISCHARGE MEDICATION RECONCILIATION
Discharge medication review for this patient is complete. Pharmacist assisted with medication reconciliation of discharge medications with prior to admission medications.     The following changes were made to the discharge medication list based on pharmacist review:  Added:  None  Discontinued: None  Changed: None      Patient's Discharge Medication List  - medications as listed on After Visit Summary (AVS)     Review of your medicines      CONTINUE these medicines which have NOT CHANGED      Dose / Directions   atorvastatin 40 MG tablet  Commonly known as:  LIPITOR      Dose:  40 mg  Take 40 mg by mouth daily  Refills:  0     calcium carbonate 600 mg-vitamin D 400 units 600-400 MG-UNIT per tablet  Commonly known as:  CALTRATE      Dose:  1 tablet  Take 1 tablet by mouth 2 times daily  Refills:  0     ferrous gluconate 325 MG Tabs      1 TABLET DAILY  Refills:  0     FLUoxetine 10 MG capsule  Commonly known as:  PROzac      Dose:  10 mg  Take 10 mg by mouth every morning  Refills:  0     Inulin 1.5 g Chew      Dose:  2 tablet  Take 2 tablets by mouth daily  Refills:  0     losartan 100 MG tablet  Commonly known as:  COZAAR      Dose:  100 mg  Take 100 mg by mouth daily  Refills:  0     MULTI-VITAMINS Tabs      Dose:  1 tablet  Take 1 tablet by mouth daily  Refills:  0     oxyCODONE-acetaminophen 5-325 MG tablet  Commonly known as:  PERCOCET      Dose:  1-2 tablet  Take 1-2 tablets by mouth every 4 hours as needed for severe pain  Quantity:  20 tablet  Refills:  0     valACYclovir 1000 mg tablet  Commonly known as:  VALTREX  Used for:  Herpes zoster without complication      Dose:  1000 mg  Take 1 tablet (1,000 mg) by mouth 3 times daily for 10 days  Quantity:  30 tablet  Refills:  0     vitamin C 100 MG tablet  Commonly known as:  ASCORBIC ACID      Dose:  100 mg  Take 100 mg by mouth daily  Refills:  0        STOP taking    triamterene-HCTZ 37.5-25 MG tablet  Commonly known as:  MAXZIDE-25

## 2019-02-04 NOTE — DISCHARGE INSTRUCTIONS
Plan for non op treatment. Monitor neurological symptoms. Encouraged hand mobility. Maintain splint until follow up appointment. Sling as needed for comfort. May follow up with Dr. Rosalio Lou in 1-2 weeks for repeat imaging. Call [phone] to schedule an appointment.

## 2019-02-04 NOTE — PROGRESS NOTES
Patient has  [place] to Observation  order. Patient has been given Patient Bill of Rights, Observation brochure and  What does Observation mean to me  forms.  Patient has been given the opportunity to ask questions about observation status and their plan of care.

## 2019-02-04 NOTE — H&P
[facility]    History and Physical - Hospitalist Service       Date of Admission:  2/3/2019    Assessment & Plan   Mery Sullivan is a 63 year old female admitted on 2/3/2019. She has history of hypertension, iron deficiency anemia, hyperlipidemia, anxiety/depression. She presents after a near syncopal episode at home.    Near-Syncope with recent syncopal episode  Lightheadedness while sitting today, BP at time 60s/40s. Yesterday syncopal after standing. Has been having intermittent lightheadedness and dizziness over month, worse in past week. Intermittent palpitations. No shortness of breath, chest pain, nausea or emesis. Vital signs stable in ED. Labs significant for chronic anemia, worse than prior. ECG shows NSR, no ischemia. Exam unremarkable. She did have blood pressure medications increased in November. Differential broad and includes orthostasis, over treated hypertension, arrhythmia, structural heart disease, vasovagal, vs other. Unlikely due to anemia with hemoglobin of 8.8, no signs of bleeding.  - continue telemetry plan for ZioPatch at discharge  - order orthostatic vital signs  - order echo  - order AM cortisol stimulation test  - stool occult blood test  - continue to monitor    Recent right humerus fracture  Occurred 2/2 after syncopal episode with fall. Evaluated in ED, reduced, placed in sling and outpatient orthopedic referral provided for patient.  - patient plans to call ortho tomorrow to arrange    Normocytic Anemia, acute on chronic  Hgb 8.8. . Baseline Hgb 11-12. Chronic dark stools on iron otherwise no signs of bleeding. Diagnosed with iron deficiency years ago and has been on iron since then, no recent work up of anemia.   - order AM hemoglobin  - consider further outpatient work up    Hypertension  Chronic. Blood pressures reviewed, stable in ED though low reading at home. Recent medication adjustment in November due to medication recall and then noted  hypertension with headaches. Added triamterene to hydrochlorothiazide and losartan.   - order orthostatic vital signs  - plan to continue home hydrochlorothiazide and losartan pending orthostatics with parameters added  - hold home triamterene    Hyperlipidemia  Chronic.  - continue home atorvastatin    Anxiety/Depression  Chronic.  - continue home fluoxetine     Diet: Regular adult diet  DVT Prophylaxis: Low Risk/Ambulatory with no VTE prophylaxis indicated  Li Catheter: not present  Code Status: Full code    Disposition Plan   Expected discharge: Tomorrow, recommended to prior living arrangement once adequate pain management/ tolerating PO medications, blood pressure greater than 95, hemoglobin stable and no further episodes of syncope with work up complete.  Entered: Crystal Lutz PA-C 02/03/2019, 6:49 PM     The patient's care was discussed with the Attending Physician, Dr. Pritesh Franco, Patient and Patient's Family.    Crystal Lutz PA-C  [facility]  ______________________________________________________________________    Chief Complaint   Near-syncope    History is obtained from the patient    History of Present Illness   Mery Sullivan is a 63 year old female who presents after near syncopal episode.    Over the past month she has been noticing intermittent lightheadedness/dizziness which is sometimes postural but also will come at rest. At times she notices brief periods of palpitations that resolve within a minute or so. Over the past week, her lightheadedness/dizziness has een occurring more frequently.     Yesterday she got up to go to the bathroom, after taking a few steps she became lightheaded and dizzy and ended up falling forward striking her face on the wall and then sliding on her right side. She was evaluated in the ED and was found to have a right humeral fracture. She was discharged home with plans for outpatient orthopedic follow up.     Today, while  sitting in her recliner she felt dizzy/lightheaded and like she was going to pass out. She took her blood pressure and noted that it was 60s/40s and so came to the ED to be evaluated. She did not pass out. She denies any chest pain, abdominal pain, nausea or emesis with this. She has not had any recent illness. She did have her blood pressure medications changed and increased in November. Initially she did not experience any symptoms after these changes were made.     Patient denies fever, chills, myalgias, cough, congestion, rhinorrhea, sore throat, shortness of breath, chest pain, abdominal pain, nausea, vomiting, diarrhea, changes in urination (dysuria, changes in frequency/urgency), rash or wounds.    Review of Systems    The 10 point Review of Systems is negative other than noted in the HPI or here.     Past Medical History    I have reviewed this patient's medical history and updated it with pertinent information if needed.   Past Medical History:   Diagnosis Date     Anemia, unspecified      Unspecified essential hypertension      Past Surgical History   I have reviewed this patient's surgical history and updated it with pertinent information if needed.  No past surgical history.    Social History   I have reviewed this patient's social history and updated it with pertinent information if needed.  Social History     Tobacco Use     Smoking status: Never Smoker     Smokeless tobacco: Never Used   Substance Use Topics     Alcohol use: Yes     Comment: occ     Drug use: No     Family History   I have reviewed this patient's family history and updated it with pertinent information if needed.   Family History   Problem Relation Age of Onset     Heart Disease Father         CHF     Hypertension Father      Cerebrovascular Disease Mother      Osteoporosis Mother      Cancer - colorectal Maternal Grandmother      Allergies Sister      Obesity Sister      Obesity Brother      Prior to Admission Medications   Prior to  Admission Medications   Prescriptions Last Dose Informant Patient Reported? Taking?   FERROUS GLUCONATE 325 MG OR TABS   Yes No   Si TABLET DAILY   FLUoxetine (PROZAC) 10 MG capsule   Yes No   Sig: Take 10 mg by mouth every morning   Inulin 1.5 g CHEW   Yes No   Sig: Take 2 tablets by mouth daily   Multiple Vitamin (MULTI-VITAMINS) TABS   Yes No   Sig: Take 1 tablet by mouth daily   atorvastatin (LIPITOR) 40 MG tablet   Yes No   Sig: Take 40 mg by mouth daily   calcium carbonate 600 mg-vitamin D 400 units (CALTRATE) 600-400 MG-UNIT per tablet   Yes No   Sig: Take 1 tablet by mouth 2 times daily   losartan (COZAAR) 100 MG tablet   Yes No   Sig: Take 100 mg by mouth daily   oxyCODONE-acetaminophen (PERCOCET) 5-325 MG tablet   No No   Sig: Take 1-2 tablets by mouth every 4 hours as needed for severe pain   triamterene-HCTZ (MAXZIDE-25) 37.5-25 MG tablet   Yes No   Sig: Take 1 tablet by mouth daily   valACYclovir (VALTREX) 1000 mg tablet   No No   Sig: Take 1 tablet (1,000 mg) by mouth 3 times daily for 10 days   vitamin C (ASCORBIC ACID) 100 MG tablet   Yes No   Sig: Take 100 mg by mouth daily      Facility-Administered Medications: None     Allergies   Allergies   Allergen Reactions     Atenolol      Cold hands and feet     Dyazide [Hydrochlorothiazide W-Triamterene]      Caused cramps     Lisinopril      Worked for blood pressure but over time caused chest discomfort     Sulfa Drugs Rash     fever     Physical Exam   Vital Signs:     BP: 113/60 Pulse: 80 Heart Rate: 84 Resp: 9 SpO2: 98 % O2 Device: None (Room air)    Weight: 0 lbs 0 oz    Constitutional: sitting up comfortably in bed, awake, alert, cooperative, no apparent distress, and appears stated age  Eyes: Lids and lashes normal, pupils equal, round and reactive to light, extra ocular muscles intact, sclera clear, conjunctiva normal  ENT: Minor bruising on bridge of nose otherwise, Normocephalic, without obvious abnormality, atraumatic, external ears  without lesions, oral pharynx with moist mucous membranes, tonsils without erythema or exudates, gums normal and good dentition.  Hematologic / Lymphatic: no cervical lymphadenopathy and no supraclavicular lymphadenopathy  Respiratory: No increased work of breathing, good air exchange, clear to auscultation bilaterally, no crackles or wheezing  Cardiovascular: Normal apical impulse, regular rate and rhythm, normal S1 and S2, no S3 or S4, and no murmur noted  GI: Normal bowel sounds, soft, non-distended, non-tender  Genitounirinary: Deferred  Skin: normal skin color, texture, turgor  Musculoskeletal: Normal bulk and tone. Moves all 4 extremities appropriately with limited ROM of right upper extremity due to ACE wrap and sling. Neurovascularly intact distally.   Neurologic: Awake, alert, oriented to name, place and time.  Cranial nerves II-XII are grossly intact.  Neuropsychiatric: normal, calm and normal eye contact    Data   Data reviewed today: I reviewed all medications, new labs and imaging results over the last 24 hours. I personally reviewed the EKG tracing showing NSR without acute ST or T wave changes.    Recent Labs   Lab 02/03/19  1717 02/02/19  2104   WBC 6.4 11.2*   HGB 8.8* 9.3*    99    261    134   POTASSIUM 3.5 3.4   CHLORIDE 100 103   CO2 28 25   BUN 15 20   CR 0.93 0.82   ANIONGAP 5 6   RADHA 8.3* 7.8*   * 115*   ALBUMIN  --  3.5   PROTTOTAL  --  6.2*   BILITOTAL  --  0.3   ALKPHOS  --  309*   ALT  --  88*   AST  --  62*   TROPI <0.015 <0.015     Recent Results (from the past 24 hour(s))   XR Humerus Port Right G/E 2 Views    Narrative    HUMERUS TWO VIEWS RIGHT  2/2/2019 8:23 PM     HISTORY: Fall with humeral injury    COMPARISON: None.      Impression    IMPRESSION: Oblique displaced fracture of the diaphysis of the humerus  at the junction of proximal one third and distal two thirds. There is  a tiny additional fragment, oblique nondisplaced fracture in the  proximal  humerus fragment extending towards the humeral neck.  Posterior displacement of the distal fragment with angulation.    MILAGRO BEAR MD   XR Humerus Right G/E 2 Views    Narrative    HUMERUS TWO VIEWS RIGHT  2/2/2019 10:27 PM     HISTORY: Reduction    COMPARISON: Right humerus 2/2/2019 2004 hours      Impression    IMPRESSION: Oblique fracture through the proximal diaphysis of the  humerus is redemonstrated. Previous angulation is resolved. There is  mild medial displacement of the distal fragment in the frontal  projection.    MILAGRO BEAR MD   XR Nasal Bones 3 Views    Narrative    X-RAY NASAL BONES 3 VIEWS 2/2/2019 10:30 PM    HISTORY: Fall with nasal trauma and epistasis    COMPARISON: None      Impression    IMPRESSION:   Nondisplaced nasal bone fracture.    MILAGRO BEAR MD

## 2019-02-04 NOTE — PLAN OF CARE
Patient's pain is controlled with 5 mg oxycodone PRN Q4h and 650 mg Tylenol PRN Q4h. Orthostatic blood pressure completed. Lyin/66, Sittin/75, Standin/81. Occult blood stool sample still needs to be collected, patient is aware but hasn't had BM yet. Up with assist of one. No dizzyness/lightheadedness reported.

## 2019-02-04 NOTE — PROGRESS NOTES
[place] [facility] ADMISSION NOTE    Patient admitted to room 2401 at approximately 2045 via cart from emergency room. Patient was accompanied by spouse, transport tech and son.     Verbal SBAR report received prior to patient arrival.     Patient ambulated to bed with one assist. Patient alert and oriented X 3. Pain is controlled with current analgesics.  Medication(s) being used: prescription NSAID's including acetaminophen and narcotic analgesics including oxycodone (Oxycontin, Oxyir).  . Admission vital signs: Blood pressure 141/64, pulse 79, temperature 100.1  F (37.8  C), temperature source Oral, resp. rate 16, weight 67.4 kg (148 lb 9.4 oz), SpO2 98 %. Patient was oriented to plan of care, call light, bed controls, tv, telephone, bathroom and visiting hours.     Risk Assessment    The following safety risks were identified during admission: fall. Yellow risk band applied: YES.     Skin Initial Assessment    This writer admitted this patient and completed a full skin assessment and Alexy score in the Adult PCS flowsheet. Appropriate interventions initiated as needed.     Secondary skin check completed by MOON Landry.    Alexy Risk Assessment  Sensory Perception: 4-->no impairment  Moisture: 4-->rarely moist  Activity: 3-->walks occasionally  Mobility: 3-->slightly limited  Nutrition: 3-->adequate  Friction and Shear: 3-->no apparent problem  Alexy Score: 20  Bed Support Surface: Atmos Air mattress    Isa Harvey

## 2019-02-04 NOTE — DISCHARGE SUMMARY
[facility]  Hospitalist Discharge Summary       Date of Admission:  2/3/2019  Date of Discharge:  2/4/2019  Discharging Provider: Tuyet Pradhan MD      Discharge Diagnoses   Near syncope with recent syncopal episode  Right humerus fracture   Acute on chronic normocytic anemia  Hypertension  Anxiety/depression, chronic    Follow-ups Needed After Discharge   Follow-up Appointments     Follow-up and recommended labs and tests      Follow up with  Dr. Rosalio Lou at  [facility], within 1-2 weeks to evaluate after hospital follow- up.         Follow-up and recommended labs and tests       Follow up with primary care provider, Evette Franco, within 7 days for hospital follow- up.  The following labs/tests are recommended: CBC, follow up on pending blood work.                Unresulted Labs Ordered in the Past 30 Days of this Admission     Date and Time Order Name Status Description    2/4/2019 0000 Cosyntropin stimulation study post 60 In process       These results will be followed up by PCP    Hospital Course    Near-Syncope with recent syncopal episode  Lightheadedness while sitting today, BP at time 60s/40s. Yesterday syncopal after standing. Has been having intermittent lightheadedness and dizziness over month, worse in past week. Intermittent palpitations. No shortness of breath, chest pain, nausea or emesis. Vital signs stable in ED. Labs significant for chronic anemia, worse than prior. ECG shows NSR, no ischemia. Exam unremarkable. She did have blood pressure medications increased in November. Differential broad and includes orthostasis, over treated hypertension, arrhythmia, structural heart disease, vasovagal, vs other. Unlikely due to anemia with hemoglobin of 8.8, no signs of bleeding.  - continue telemetry plan for ZioPatch at discharge  - order orthostatic vital signs  - order echo  - order AM cortisol stimulation test  - stool occult blood test  - continue to  monitor    2/4/2019 echo with EF of 55-60%  Cosyntropin stim test pending  Zio patch ordered and should be placed prior to her leaving today     Recent right humerus fracture  Occurred 2/2 after syncopal episode with fall. Evaluated in ED, reduced, placed in sling and outpatient orthopedic referral provided for patient.  - patient plans to call ortho tomorrow to arrange    Per ortho:  A/P: Right spiral proximal humerus fx with mild displacement.   Plan for non op treatment. Monitor neuro symptoms. Encouraged hand ROM. Maintain splint until follow up appointment. Sling as needed for comfort. May follow up with Dr. Rosalio Lou in 1-2 weeks for repeat imaging. I anticipate fracture alignment will improve with gravity assisted traction. May transition to fracture brace as determined appropriate at follow up appointments. Given patients observation status she may discharge when medically necessary.      Sara Breaux PA-C   [facility]           Normocytic Anemia, acute on chronic  Hgb 8.8. . Baseline Hgb 11-12. Chronic dark stools on iron otherwise no signs of bleeding. Diagnosed with iron deficiency years ago and has been on iron since then, no recent work up of anemia.   - order AM hemoglobin  - consider further outpatient work up    2/4/2019 peripheral smear, b12, folate, ferritin and iron pending  Recent colonoscopy 10/2018 with hyperplastic polyp  Has not had EGD done  Further evaluation necessary, if unremarkable consider hematology as outpatient     Hypertension  Chronic. Blood pressures reviewed, stable in ED though low reading at home. Recent medication adjustment in November due to medication recall and then noted hypertension with headaches. Added triamterene to hydrochlorothiazide and losartan.   - order orthostatic vital signs  - plan to continue home hydrochlorothiazide and losartan pending orthostatics with parameters added  - hold home triamterene    2/4/2019 continue  losartan  Follow up with PCP     Hyperlipidemia  Chronic.  - continue home atorvastatin     Anxiety/Depression  Chronic.  - continue home fluoxetine          Consultations This Hospital Stay   None    Code Status   Full Code    Time Spent on this Encounter   I, Tuyet Pradhan, personally saw the patient today and spent greater than 30 minutes discharging this patient.       Tuyet Pradhan MD  [facility]  ______________________________________________________________________    ROS: 5 point ROS negative except as noted above in HPI, including Gen., Resp., CV, GI &  system review.  Right shoulder/arm is painful  Oral intake is okay  Denies chest pain/palpitations or shortness of breath    Physical Exam   Vital Signs: Temp: 98  F (36.7  C) Temp src: Oral BP: 122/68 Pulse: 80 Heart Rate: 61 Resp: 18 SpO2: 99 % O2 Device: None (Room air)    Weight: 148 lbs 9.44 oz  Constitutional: awake, alert, cooperative, no apparent distress, and appears stated age  ENT: normocepalic, without obvious abnormality  Hematologic / Lymphatic: no cervical lymphadenopathy and no supraclavicular lymphadenopathy  Respiratory: No increased work of breathing, good air exchange, clear to auscultation bilaterally, no crackles or wheezing  Cardiovascular: Normal apical impulse, regular rate and rhythm, normal S1 and S2, no S3 or S4, and no murmur noted  Skin: no rashes  Musculoskeletal: right arm in a sling, guarding with any movement  Neuropsychiatric: General: normal, calm and normal eye contact  Level of consciousness: alert / normal  Affect: normal       Primary Care Physician   Evette Franco    Discharge Disposition   Discharged to home  Condition at discharge: Stable    Significant Results and Procedures   Most Recent 3 CBC's:  Recent Labs   Lab Test 02/04/19  0511 02/03/19  1717 02/02/19  2104   WBC 5.1 6.4 11.2*   HGB 8.7* 8.8* 9.3*    100 99    237 261     Most Recent 3 BMP's:  Recent Labs    Lab Test 02/04/19  0511 02/03/19  1717 02/02/19  2104    133 134   POTASSIUM 3.8 3.5 3.4   CHLORIDE 100 100 103   CO2 29 28 25   BUN 11 15 20   CR 0.90 0.93 0.82   ANIONGAP 5 5 6   RADHA 8.1* 8.3* 7.8*   GLC 93 116* 115*   ,   Results for orders placed or performed during the hospital encounter of 02/02/19   XR Humerus Port Right G/E 2 Views    Narrative    HUMERUS TWO VIEWS RIGHT  2/2/2019 8:23 PM     HISTORY: Fall with humeral injury    COMPARISON: None.      Impression    IMPRESSION: Oblique displaced fracture of the diaphysis of the humerus  at the junction of proximal one third and distal two thirds. There is  a tiny additional fragment, oblique nondisplaced fracture in the  proximal humerus fragment extending towards the humeral neck.  Posterior displacement of the distal fragment with angulation.    MILAGRO BEAR MD   XR Humerus Right G/E 2 Views    Narrative    HUMERUS TWO VIEWS RIGHT  2/2/2019 10:27 PM     HISTORY: Reduction    COMPARISON: Right humerus 2/2/2019 2004 hours      Impression    IMPRESSION: Oblique fracture through the proximal diaphysis of the  humerus is redemonstrated. Previous angulation is resolved. There is  mild medial displacement of the distal fragment in the frontal  projection.    MILAGRO BEAR MD   XR Nasal Bones 3 Views    Narrative    X-RAY NASAL BONES 3 VIEWS 2/2/2019 10:30 PM    HISTORY: Fall with nasal trauma and epistasis    COMPARISON: None      Impression    IMPRESSION:   Nondisplaced nasal bone fracture.    MILAGRO BEAR MD       Discharge Orders      Follow-up and recommended labs and tests    Follow up with  Dr. Rosalio Lou at  [facility], within 1-2 weeks to evaluate after hospital follow- up.     Activity    Your activity upon discharge:   Non weight bearing on the right arm. Keep splint clean, dry and intact until follow up. Sling on PRN for comfort. It is okay to allow the arm to hang and allow gravity to pull gently on the arm. Hand motion  is encouraged.     When to contact your care team    Call your Orthopedic surgeon at [facility]  if you have any of the following: sudden change in sensation in your hand or fingers.     Wound care and dressings    Instructions to care for your wound at home:splint clean, dry and intact until follow up.     Reason for your hospital stay    Near syncopal episode with recent syncope and humerus fracture     Follow-up and recommended labs and tests     Follow up with primary care provider, Evette Franco, within 7 days for hospital follow- up.  The following labs/tests are recommended: CBC, follow up on pending blood work.     Full Code     Zio Patch Holter Adult Pediatric Greater than 48 hrs     Diet    Follow this diet upon discharge: Orders Placed This Encounter      Regular Diet Adult       Discharge Medications   Current Discharge Medication List      CONTINUE these medications which have NOT CHANGED    Details   atorvastatin (LIPITOR) 40 MG tablet Take 40 mg by mouth daily      calcium carbonate 600 mg-vitamin D 400 units (CALTRATE) 600-400 MG-UNIT per tablet Take 1 tablet by mouth 2 times daily      FERROUS GLUCONATE 325 MG OR TABS 1 TABLET DAILY      FLUoxetine (PROZAC) 10 MG capsule Take 10 mg by mouth every morning      Inulin 1.5 g CHEW Take 2 tablets by mouth daily      losartan (COZAAR) 100 MG tablet Take 100 mg by mouth daily      Multiple Vitamin (MULTI-VITAMINS) TABS Take 1 tablet by mouth daily      oxyCODONE-acetaminophen (PERCOCET) 5-325 MG tablet Take 1-2 tablets by mouth every 4 hours as needed for severe pain  Qty: 20 tablet, Refills: 0      vitamin C (ASCORBIC ACID) 100 MG tablet Take 100 mg by mouth daily      valACYclovir (VALTREX) 1000 mg tablet Take 1 tablet (1,000 mg) by mouth 3 times daily for 10 days  Qty: 30 tablet, Refills: 0    Associated Diagnoses: Herpes zoster without complication         STOP taking these medications       triamterene-HCTZ (MAXZIDE-25) 37.5-25 MG  tablet Comments:   Reason for Stopping:             Allergies   Allergies   Allergen Reactions     Atenolol      Cold hands and feet     Dyazide [Hydrochlorothiazide W-Triamterene]      Caused cramps     Lisinopril      Worked for blood pressure but over time caused chest discomfort     Sulfa Drugs Rash     fever

## 2019-02-04 NOTE — PLAN OF CARE
[facility] DISCHARGE NOTE    Patient discharged to home at 1:55 PM via wheel chair. Accompanied by spouse and staff. Discharge instructions reviewed with patient, opportunity offered to ask questions. Prescriptions - None ordered for discharge. All belongings sent with patient.    eJnnifer Dinh

## 2019-02-04 NOTE — CONSULTS
Chart Review Orthopedic Consult note    A/P: Right spiral proximal humerus fx with mild displacement.   Plan for non op treatment. Monitor neuro symptoms. Encouraged hand ROM. Maintain splint until follow up appointment. Sling as needed for comfort. May follow up with Dr. Rosalio Lou in 1-2 weeks for repeat imaging. I anticipate fracture alignment will improve with gravity assisted traction. May transition to fracture brace as determined appropriate at follow up appointments. Given patients observation status she may discharge when medically necessary.     Sara Breaux PA-C   [facility]

## 2019-02-05 ENCOUNTER — TELEPHONE (OUTPATIENT)
Dept: FAMILY MEDICINE | Facility: CLINIC | Age: 64
End: 2019-02-05

## 2019-02-05 LAB — COPATH REPORT: NORMAL

## 2019-02-08 ENCOUNTER — OFFICE VISIT (OUTPATIENT)
Dept: FAMILY MEDICINE | Facility: CLINIC | Age: 64
End: 2019-02-08
Payer: COMMERCIAL

## 2019-02-08 VITALS
HEIGHT: 64 IN | SYSTOLIC BLOOD PRESSURE: 126 MMHG | BODY MASS INDEX: 26.29 KG/M2 | HEART RATE: 76 BPM | TEMPERATURE: 97.5 F | DIASTOLIC BLOOD PRESSURE: 80 MMHG | WEIGHT: 154 LBS

## 2019-02-08 DIAGNOSIS — R10.12 LUQ ABDOMINAL PAIN: ICD-10-CM

## 2019-02-08 DIAGNOSIS — D64.9 ANEMIA, UNSPECIFIED TYPE: ICD-10-CM

## 2019-02-08 DIAGNOSIS — Z13.1 SCREENING FOR DIABETES MELLITUS: ICD-10-CM

## 2019-02-08 DIAGNOSIS — D64.9 LOW HEMOGLOBIN: ICD-10-CM

## 2019-02-08 DIAGNOSIS — I10 BENIGN ESSENTIAL HYPERTENSION: Primary | ICD-10-CM

## 2019-02-08 LAB
ALBUMIN SERPL-MCNC: 3.6 G/DL (ref 3.4–5)
ALP SERPL-CCNC: 332 U/L (ref 40–150)
ALT SERPL W P-5'-P-CCNC: 115 U/L (ref 0–50)
ANION GAP SERPL CALCULATED.3IONS-SCNC: 6 MMOL/L (ref 3–14)
AST SERPL W P-5'-P-CCNC: 76 U/L (ref 0–45)
BASOPHILS # BLD AUTO: 0 10E9/L (ref 0–0.2)
BASOPHILS NFR BLD AUTO: 0.9 %
BILIRUB SERPL-MCNC: 0.6 MG/DL (ref 0.2–1.3)
BUN SERPL-MCNC: 15 MG/DL (ref 7–30)
CALCIUM SERPL-MCNC: 9.4 MG/DL (ref 8.5–10.1)
CHLORIDE SERPL-SCNC: 98 MMOL/L (ref 94–109)
CO2 SERPL-SCNC: 28 MMOL/L (ref 20–32)
CREAT SERPL-MCNC: 0.81 MG/DL (ref 0.52–1.04)
DIFFERENTIAL METHOD BLD: ABNORMAL
EOSINOPHIL # BLD AUTO: 0.1 10E9/L (ref 0–0.7)
EOSINOPHIL NFR BLD AUTO: 3 %
ERYTHROCYTE [DISTWIDTH] IN BLOOD BY AUTOMATED COUNT: 11.8 % (ref 10–15)
GFR SERPL CREATININE-BSD FRML MDRD: 79 ML/MIN/{1.73_M2}
GLUCOSE SERPL-MCNC: 94 MG/DL (ref 70–99)
HBA1C MFR BLD: 5.1 % (ref 0–5.6)
HCT VFR BLD AUTO: 27.1 % (ref 35–47)
HGB BLD-MCNC: 8.9 G/DL (ref 11.7–15.7)
LIPASE SERPL-CCNC: 170 U/L (ref 73–393)
LYMPHOCYTES # BLD AUTO: 1.6 10E9/L (ref 0.8–5.3)
LYMPHOCYTES NFR BLD AUTO: 35.1 %
MCH RBC QN AUTO: 32.7 PG (ref 26.5–33)
MCHC RBC AUTO-ENTMCNC: 32.8 G/DL (ref 31.5–36.5)
MCV RBC AUTO: 100 FL (ref 78–100)
MONOCYTES # BLD AUTO: 0.5 10E9/L (ref 0–1.3)
MONOCYTES NFR BLD AUTO: 10.8 %
NEUTROPHILS # BLD AUTO: 2.3 10E9/L (ref 1.6–8.3)
NEUTROPHILS NFR BLD AUTO: 50.2 %
PLATELET # BLD AUTO: 295 10E9/L (ref 150–450)
POTASSIUM SERPL-SCNC: 3.9 MMOL/L (ref 3.4–5.3)
PROT SERPL-MCNC: 6.9 G/DL (ref 6.8–8.8)
RBC # BLD AUTO: 2.72 10E12/L (ref 3.8–5.2)
SODIUM SERPL-SCNC: 132 MMOL/L (ref 133–144)
WBC # BLD AUTO: 4.6 10E9/L (ref 4–11)

## 2019-02-08 PROCEDURE — 84165 PROTEIN E-PHORESIS SERUM: CPT | Performed by: NURSE PRACTITIONER

## 2019-02-08 PROCEDURE — 00000402 ZZHCL STATISTIC TOTAL PROTEIN: Performed by: NURSE PRACTITIONER

## 2019-02-08 PROCEDURE — 99214 OFFICE O/P EST MOD 30 MIN: CPT | Performed by: NURSE PRACTITIONER

## 2019-02-08 PROCEDURE — 80053 COMPREHEN METABOLIC PANEL: CPT | Performed by: NURSE PRACTITIONER

## 2019-02-08 PROCEDURE — 83036 HEMOGLOBIN GLYCOSYLATED A1C: CPT | Performed by: NURSE PRACTITIONER

## 2019-02-08 PROCEDURE — 83690 ASSAY OF LIPASE: CPT | Performed by: NURSE PRACTITIONER

## 2019-02-08 PROCEDURE — 85025 COMPLETE CBC W/AUTO DIFF WBC: CPT | Performed by: NURSE PRACTITIONER

## 2019-02-08 PROCEDURE — 36415 COLL VENOUS BLD VENIPUNCTURE: CPT | Performed by: NURSE PRACTITIONER

## 2019-02-08 RX ORDER — OXYCODONE AND ACETAMINOPHEN 5; 325 MG/1; MG/1
1-2 TABLET ORAL EVERY 4 HOURS PRN
Qty: 20 TABLET | Refills: 0 | Status: SHIPPED | OUTPATIENT
Start: 2019-02-08 | End: 2019-05-01

## 2019-02-08 RX ORDER — LOSARTAN POTASSIUM 50 MG/1
50 TABLET ORAL
Qty: 60 TABLET | Refills: 0 | Status: SHIPPED | OUTPATIENT
Start: 2019-02-08 | End: 2019-06-19

## 2019-02-08 ASSESSMENT — MIFFLIN-ST. JEOR: SCORE: 1248.54

## 2019-02-08 NOTE — PROGRESS NOTES
SUBJECTIVE:   Mery Sullivan is a 63 year old female who presents to clinic today for the following health issues:        Hospital Follow-up Visit:    Hospital/Nursing Home/IP Rehab Facility: [facility]  Date of Admission: 2/3/19  Date of Discharge: 2/4/19  Reason(s) for Admission: near syncope            Problems taking medications regularly:  None       Medication changes since discharge: None       Problems adhering to non-medication therapy:  None    Summary of hospitalization:  [facility] discharge summary reviewed  Diagnostic Tests/Treatments reviewed.  Follow up needed: none  Other Healthcare Providers Involved in Patient s Care:         None  Update since discharge: improved.       Post Discharge Medication Reconciliation: discharge medications reconciled and changed, per note/orders (see AVS).  Plan of care communicated with patient     Coding guidelines for this visit:  Type of Medical   Decision Making Face-to-Face Visit       within 7 Days of discharge Face-to-Face Visit        within 14 days of discharge   Moderate Complexity [id number]   High Complexity [id number]            Problem list and histories reviewed & adjusted, as indicated.  Additional history: as documented    Patient Active Problem List   Diagnosis     Essential hypertension, benign     Dysthymic disorder     Generalized anxiety disorder     Hyperlipidemia     Mantoux: positive, treated     Near syncope     History reviewed. No pertinent surgical history.    Social History     Tobacco Use     Smoking status: Never Smoker     Smokeless tobacco: Never Used   Substance Use Topics     Alcohol use: Yes     Comment: occ     Family History   Problem Relation Age of Onset     Heart Disease Father         CHF     Hypertension Father      Cerebrovascular Disease Mother      Osteoporosis Mother      Cancer - colorectal Maternal Grandmother      Allergies Sister      Obesity Sister      Obesity Brother          Current  Outpatient Medications   Medication Sig Dispense Refill     atorvastatin (LIPITOR) 40 MG tablet Take 40 mg by mouth daily       calcium carbonate 600 mg-vitamin D 400 units (CALTRATE) 600-400 MG-UNIT per tablet Take 1 tablet by mouth 2 times daily       FERROUS GLUCONATE 325 MG OR TABS 1 TABLET DAILY       FLUoxetine (PROZAC) 10 MG capsule Take 10 mg by mouth every morning       Inulin 1.5 g CHEW Take 2 tablets by mouth daily       losartan (COZAAR) 50 MG tablet Take 1 tablet (50 mg) by mouth 2 times daily 60 tablet 0     Multiple Vitamin (MULTI-VITAMINS) TABS Take 1 tablet by mouth daily       oxyCODONE-acetaminophen (PERCOCET) 5-325 MG tablet Take 1-2 tablets by mouth every 4 hours as needed for severe pain 20 tablet 0     vitamin C (ASCORBIC ACID) 100 MG tablet Take 100 mg by mouth daily       Allergies   Allergen Reactions     Atenolol      Cold hands and feet     Dyazide [Hydrochlorothiazide W-Triamterene]      Caused cramps     Lisinopril      Worked for blood pressure but over time caused chest discomfort     Sulfa Drugs Rash     fever     Recent Labs   Lab Test 02/08/19  1053 02/04/19  0511 02/03/19  1717 02/02/19  2104   A1C 5.1  --   --   --    *  --   --  88*   CR 0.81 0.90 0.93 0.82   GFRESTIMATED 79 68 66 77   GFRESTBLACK >90 79 77 89   POTASSIUM 3.9 3.8 3.5 3.4   TSH  --   --  1.91  --       BP Readings from Last 3 Encounters:   02/08/19 126/80   02/04/19 122/68   02/02/19 135/69    Wt Readings from Last 3 Encounters:   02/08/19 69.9 kg (154 lb)   02/03/19 67.4 kg (148 lb 9.4 oz)   01/03/19 65.1 kg (143 lb 9.6 oz)                    Reviewed and updated as needed this visit by clinical staff       Reviewed and updated as needed this visit by Provider         ROS:  Constitutional, HEENT, cardiovascular, pulmonary, gi and gu systems are negative, except as otherwise noted.    OBJECTIVE:     /80 (BP Location: Right arm, Cuff Size: Adult Large)   Pulse 76   Temp 97.5  F (36.4  C) (Tympanic)   " Ht 1.626 m (5' 4\")   Wt 69.9 kg (154 lb)   BMI 26.43 kg/m    Body mass index is 26.43 kg/m .  GENERAL: healthy, alert and no distress  EYES: Eyes grossly normal to inspection, PERRL and conjunctivae and sclerae normal  HENT: ear canals and TM's normal, nose and mouth without ulcers or lesions  NECK: no adenopathy, no asymmetry, masses, or scars and thyroid normal to palpation  RESP: lungs clear to auscultation - no rales, rhonchi or wheezes  CV: regular rate and rhythm, normal S1 S2, no S3 or S4, no murmur, click or rub, no peripheral edema and peripheral pulses strong  MS: no gross musculoskeletal defects noted, no edema  SKIN: no suspicious lesions or rashes  NEURO: Normal strength and tone, mentation intact and speech normal  PSYCH: mentation appears normal, affect normal/bright    Results for orders placed or performed in visit on 02/08/19   CBC with platelets differential   Result Value Ref Range    WBC 4.6 4.0 - 11.0 10e9/L    RBC Count 2.72 (L) 3.8 - 5.2 10e12/L    Hemoglobin 8.9 (L) 11.7 - 15.7 g/dL    Hematocrit 27.1 (L) 35.0 - 47.0 %     78 - 100 fl    MCH 32.7 26.5 - 33.0 pg    MCHC 32.8 31.5 - 36.5 g/dL    RDW 11.8 10.0 - 15.0 %    Platelet Count 295 150 - 450 10e9/L    % Neutrophils 50.2 %    % Lymphocytes 35.1 %    % Monocytes 10.8 %    % Eosinophils 3.0 %    % Basophils 0.9 %    Absolute Neutrophil 2.3 1.6 - 8.3 10e9/L    Absolute Lymphocytes 1.6 0.8 - 5.3 10e9/L    Absolute Monocytes 0.5 0.0 - 1.3 10e9/L    Absolute Eosinophils 0.1 0.0 - 0.7 10e9/L    Absolute Basophils 0.0 0.0 - 0.2 10e9/L    Diff Method Automated Method    Comprehensive metabolic panel   Result Value Ref Range    Sodium 132 (L) 133 - 144 mmol/L    Potassium 3.9 3.4 - 5.3 mmol/L    Chloride 98 94 - 109 mmol/L    Carbon Dioxide 28 20 - 32 mmol/L    Anion Gap 6 3 - 14 mmol/L    Glucose 94 70 - 99 mg/dL    Urea Nitrogen 15 7 - 30 mg/dL    Creatinine 0.81 0.52 - 1.04 mg/dL    GFR Estimate 79 >60 mL/min/[1.73_m2]    GFR " Estimate If Black >90 >60 mL/min/[1.73_m2]    Calcium 9.4 8.5 - 10.1 mg/dL    Bilirubin Total 0.6 0.2 - 1.3 mg/dL    Albumin 3.6 3.4 - 5.0 g/dL    Protein Total 6.9 6.8 - 8.8 g/dL    Alkaline Phosphatase 332 (H) 40 - 150 U/L     (H) 0 - 50 U/L    AST 76 (H) 0 - 45 U/L   Lipase   Result Value Ref Range    Lipase 170 73 - 393 U/L   Hemoglobin A1c   Result Value Ref Range    Hemoglobin A1C 5.1 0 - 5.6 %   Protein electrophoresis   Result Value Ref Range    Albumin Fraction 3.8 3.7 - 5.1 g/dL    Alpha 1 Fraction 0.3 0.2 - 0.4 g/dL    Alpha 2 Fraction 0.8 0.5 - 0.9 g/dL    Beta Fraction 0.8 0.6 - 1.0 g/dL    Gamma Fraction 0.7 0.7 - 1.6 g/dL    Monoclonal Peak 0.0 0.0 g/dL    ELP Interpretation:       Essentially normal electrophoretic pattern.  No monoclonal protein seen. Pathologic   significance requires clinical correlation.  DARWIN Beasley M.D., Ph.D., Pathologist ([phone]).            ASSESSMENT/PLAN:   (I10) Benign essential hypertension  (primary encounter diagnosis)  Comment: Patient had a period of low blood pressure she was hospitalized for pressure for her blood pressure Was in the 60s.  Patient has not had any further episodes of that.  Feels it was due to taking losartan 100 mg all at once.  Patient will start to take losartan 50 mg twice a day.  Do recommend she start with just 50 mg once a day monitor her blood pressure and see work status she is maintaining less than 140/80 patient should just maintain at the 50 mg dose if not she should separate them and take 50 mg twice daily  Plan: losartan (COZAAR) 50 MG tablet      (D64.9) Anemia, unspecified type  Comment: Patient has new onset anemia.  Did have a fracture of the elbow could be a source of why her hemoglobin is so low.  We will recheck hemoglobin in the next 2-3 weeks if remains low will have her follow-up with hematology.  Patient also had a negative fit test in the hospital.  If hemoglobin not recovering patient denies any maroon  stools we will move forward with reviewing getting a colonoscopy repeated.  Will wait for repeat CBC in 2-3 weeks  Plan: CBC with platelets differential, Protein         electrophoresis, ONC/HEME ADULT REFERRAL,         GASTROENTEROLOGY ADULT REF PROCEDURE ONLY      (R10.12) LUQ abdominal pain  Comment: Patient had abdominal pain which has resolved.  Did obtain a comprehensive panel.  And lipase.  Lipase normal liver enzymes elevated.  At the time we repeat her CBC will work on further test for hepatitis and repeat liver enzymes based on the elevated liver enzymes recommend holding her statin did inform patient that.  We will determine when she should restart her statin once we see her repeat liver enzymes and further workup of the elevated liver enzymes   Plan: Comprehensive metabolic panel, Lipase,         oxyCODONE-acetaminophen (PERCOCET) 5-325 MG         tablet        (D64.9) Low hemoglobin  Comment: patient noted to have low hemoglobin in the hospital could be related to the fracture that she recently sustained will repeat recheck and if not improving will send her to hematology  Plan: CBC with platelets differential        (Z13.1) Screening for diabetes mellitus  Comment: Hemoglobin A1c is 5.1.  Plan: Hemoglobin A1c          LATESHA Venegas CNP  [facility]

## 2019-02-08 NOTE — PATIENT INSTRUCTIONS
Patient Education     Anemia  Anemia is a condition that occurs when your body does not have enough healthy red blood cells (RBCs). RBCs are the parts of your blood that carry oxygen throughout your body. A protein called hemoglobin allows your RBCs to absorb and release oxygen. Without enough RBCs or hemoglobin, your body doesn't get enough oxygen. Symptoms of anemia may then occur.    What are the symptoms of anemia?  Some people with anemia have no symptoms. But most people have symptoms that range from mild to severe. These can include:    Tiredness (fatigue)    Weakness    Pale skin    Shortness of breath    Dizziness or fainting    Rapid heartbeat    Trouble doing normal amounts of activity    Jaundice (yellowing of your eyes, skin, or mouth; dark urine)  What causes anemia?  Anemia can occur when your body:    Loses too much blood    Does not make enough RBCs    Destroys your RBCs at a faster rate than it can replace them    Does not make a normal amount of hemoglobin in your RBCs  These problems can occur for many reasons, including:    A condition that you are born with (congenital or inherited), such as sickle cell disease or thalassemia    Heavy bleeding for any reason, including injury, surgery, childbirth, or even heavy menstrual periods    Being low in certain nutrients, such as iron, folate, or vitamin B12, possibly from a poor diet or a condition like celiac disease or Crohn's disease    Certain chronic conditions like diabetes, arthritis, or kidney disease    Certain chronic infections like tuberculosis or HIV    Exposure to certain medicines, such as those used for chemotherapy  There are different types of anemia. Your healthcare provider can tell you more about the type of anemia you have and what may have caused it.  How is anemia diagnosed?  To diagnose anemia, your healthcare provider orders blood tests. These can include:    Complete blood cell count (CBC). This test measures the amounts of  the different types of blood cells.    Blood smear. This test checks the size and shape of your blood cells. To do the test, a drop of your blood is viewed under a microscope. A stain is used to make the blood cells easier to see.    Iron studies. These tests measure the amount of iron in your blood. Your body needs iron to make hemoglobin in your RBCs.    Vitamin B12 and folate studies. These tests check for some of the components that help give RBCs a normal size and shape.    Reticulocyte count. This test measures the amount of new RBCs that your bone marrow makes.    Hemoglobin electrophoresis. This test checks for problems with your hemoglobin in RBCs.  How is anemia treated?  Treatment for anemia is based on the type of anemia, its cause, and the severity of your symptoms. Treatments may include:    Diet changes. This involves increasing the amount of certain nutrients in your diet, such as iron, vitamin B12, or folate. Your healthcare provider may also prescribe nutrient supplements.    Medicines. Certain medicines treat the cause of your anemia. Others help build new RBCs or relieve symptoms. If a medicine is the cause of your anemia, you may need to stop or change it.    Blood transfusions. Replacing some of your blood can increase the number of healthy RBCs in your body.    Surgery. In some cases, your doctor may do surgery to treat the underlying cause of anemia. If you need surgery, your healthcare provider will explain the procedure and outline the risks and benefits for you.  What are the long-term concerns?  If you have a certain type of anemia, you can expect a full recovery after treatment. If you have other types of anemia (especially a type you're born with), you will need to manage it for life. Your doctor can tell you more.  Date Last Reviewed: 12/1/2016 2000-2018 [employer]. [address] [place] [address], [place] [address]. All rights reserved. This information is not intended as a  substitute for professional medical care. Always follow your healthcare professional's instructions.

## 2019-02-11 ENCOUNTER — TELEPHONE (OUTPATIENT)
Dept: FAMILY MEDICINE | Facility: CLINIC | Age: 64
End: 2019-02-11

## 2019-02-11 ENCOUNTER — TRANSFERRED RECORDS (OUTPATIENT)
Dept: HEALTH INFORMATION MANAGEMENT | Facility: CLINIC | Age: 64
End: 2019-02-11

## 2019-02-11 DIAGNOSIS — R74.8 ELEVATED LIVER ENZYMES: Primary | ICD-10-CM

## 2019-02-11 LAB
ALBUMIN SERPL ELPH-MCNC: 3.8 G/DL (ref 3.7–5.1)
ALPHA1 GLOB SERPL ELPH-MCNC: 0.3 G/DL (ref 0.2–0.4)
ALPHA2 GLOB SERPL ELPH-MCNC: 0.8 G/DL (ref 0.5–0.9)
B-GLOBULIN SERPL ELPH-MCNC: 0.8 G/DL (ref 0.6–1)
GAMMA GLOB SERPL ELPH-MCNC: 0.7 G/DL (ref 0.7–1.6)
M PROTEIN SERPL ELPH-MCNC: 0 G/DL
PROT PATTERN SERPL ELPH-IMP: NORMAL

## 2019-02-14 ENCOUNTER — TRANSFERRED RECORDS (OUTPATIENT)
Dept: HEALTH INFORMATION MANAGEMENT | Facility: CLINIC | Age: 64
End: 2019-02-14

## 2019-02-14 ENCOUNTER — HOSPITAL ENCOUNTER (OUTPATIENT)
Facility: CLINIC | Age: 64
End: 2019-02-14
Attending: ORTHOPAEDIC SURGERY | Admitting: ORTHOPAEDIC SURGERY
Payer: COMMERCIAL

## 2019-02-15 RX ORDER — CEFAZOLIN SODIUM 1 G/3ML
1 INJECTION, POWDER, FOR SOLUTION INTRAMUSCULAR; INTRAVENOUS SEE ADMIN INSTRUCTIONS
Status: CANCELLED | OUTPATIENT
Start: 2019-02-15

## 2019-02-15 RX ORDER — CEFAZOLIN SODIUM 2 G/100ML
2 INJECTION, SOLUTION INTRAVENOUS
Status: CANCELLED | OUTPATIENT
Start: 2019-02-15

## 2019-02-18 ENCOUNTER — TRANSFERRED RECORDS (OUTPATIENT)
Dept: HEALTH INFORMATION MANAGEMENT | Facility: CLINIC | Age: 64
End: 2019-02-18

## 2019-02-18 ENCOUNTER — TELEPHONE (OUTPATIENT)
Dept: FAMILY MEDICINE | Facility: CLINIC | Age: 64
End: 2019-02-18

## 2019-02-18 ENCOUNTER — ANESTHESIA EVENT (OUTPATIENT)
Dept: SURGERY | Facility: CLINIC | Age: 64
End: 2019-02-18

## 2019-02-18 DIAGNOSIS — R74.8 ELEVATED LIVER ENZYMES: Primary | ICD-10-CM

## 2019-02-18 RX ORDER — LIDOCAINE 40 MG/G
CREAM TOPICAL
Status: CANCELLED | OUTPATIENT
Start: 2019-02-18

## 2019-02-18 RX ORDER — SODIUM CHLORIDE, SODIUM LACTATE, POTASSIUM CHLORIDE, CALCIUM CHLORIDE 600; 310; 30; 20 MG/100ML; MG/100ML; MG/100ML; MG/100ML
INJECTION, SOLUTION INTRAVENOUS CONTINUOUS
Status: CANCELLED | OUTPATIENT
Start: 2019-02-18

## 2019-02-18 NOTE — ANESTHESIA PREPROCEDURE EVALUATION
Anesthesia Pre-Procedure Evaluation    Patient: Tam Sullivan   MRN: [id number] : 1955          Preoperative Diagnosis: right proximal humerus fracture    Procedure(s):  OPEN REDUCTION INTERNAL FIXATION RIGHT  PROXIMAL Humerus Fracture    Past Medical History:   Diagnosis Date     Anemia, unspecified      Unspecified essential hypertension      History reviewed. No pertinent surgical history.    Anesthesia Evaluation     .             ROS/MED HX    ENT/Pulmonary:       Neurologic:       Cardiovascular: Comment: Recently hospitalized for hypotension and syncopal episode causing right humerus fx    (+) Dyslipidemia, hypertension----. : . . . :. . Previous cardiac testing Echodate:19results:Echocardiogram Complete   Order: [id number]   Status: Edited Result - FINAL   Visible to patient: No (Inaccessible in MyChart) Next appt: Today at 03:20 PM in Family Practice (Praveena Kiser, LATESHA CNP)   Details       Reading Physician Reading Date Result Priority  Keven Montoya MD 2019     Narrative    [id number]  [id number]  [id number]  [id number]^CADE^JULIA^[facility]  Echocardiography Laboratory  [facility] [place] [address]        Name: TAM SULLIVAN  MRN: [id number]  : 1955  Study Date: 2019 08:11 AM  Age: 63 yrs  Gender: Female  Patient Location: [facility]  Reason For Study: Syncope  Ordering Physician: JULIA MOHAMUD  Referring Physician: Evette Franco  Performed By: Sara Lares RDCS     BSA: 1.7 m2  Height: 64 in  Weight: 149 lb  HR: 67  BP: 128/68 mmHg  _____________________________________________________________________________  __        Procedure  Complete Portable Echo Adult.  _____________________________________________________________________________  __        Interpretation Summary     The visual ejection fraction is estimated at 55-60%.  Left ventricular systolic function is normal.  Contrast would enhance image quality.  Endocardial definition was barely  diagnostic. The study was technically difficult.  _____________________________________________________________________________  __        Left Ventricle  The left ventricle is normal in size. There is normal left ventricular wall  thickness. The visual ejection fraction is estimated at 55-60%. Left  ventricular systolic function is normal. Diastolic Doppler findings (E/E'  ratio and/or other parameters) suggest left ventricular filling pressures are  indeterminate.     Right Ventricle  The right ventricle is normal in size and function.     Atria  Normal left atrial size. Right atrial size is normal. There is no color  Doppler evidence of an atrial shunt.     Mitral Valve  There is trace mitral regurgitation.        Tricuspid Valve  There is trace tricuspid regurgitation. The right ventricular systolic  pressure is approximated at 18.5 mmHg plus the right atrial pressure.     Aortic Valve  The aortic valve is not well visualized. No aortic regurgitation is present.     Pulmonic Valve  There is no pulmonic valvular regurgitation.     Vessels  The aortic root is normal size. Normal size ascending aorta. The inferior vena  cava is not dilated.     Pericardium  Trivial pericardial effusion.     _____________________________________________________________________________  __  MMode/2D Measurements & Calculations  IVSd: 0.90 cm  LVIDd: 4.8 cm  LVIDs: 2.6 cm  LVPWd: 0.87 cm  FS: 46.0 %  LV mass(C)d: 145.4 grams  LV mass(C)dI: 84.2 grams/m2     Ao root diam: 3.0 cm  LA dimension: 3.5 cm  asc Aorta Diam: 2.8 cm  LA/Ao: 1.2  LA Volume (BP): 44.0 ml  LA Volume Index (BP): 25.4 ml/m2  RWT: 0.36        Doppler Measurements & Calculations  MV E max lidia: 67.3 cm/sec  MV A max lidia: 78.4 cm/sec  MV E/A: 0.86  MV dec time: 0.14 sec     TR max lidia: 215.1 cm/sec  TR max P.5 mmHg  E/E' av.4  Lateral E/e': 6.4  Medial E/e':  "10.5           _____________________________________________________________________________  __           Report approved by: Grant Mckenzie 02/04/2019 11:41 AM           date: results:ECG reviewed date:2/3/19 results:SR date: results:          METS/Exercise Tolerance:     Hematologic:     (+) Anemia, -      Musculoskeletal:   (+) fracture upper extremity: Other (comment) (Right humerus fx after syncopal episode), , -       GI/Hepatic:     (+) Other GI/Hepatic elivated liver enzymes      Renal/Genitourinary:         Endo:         Psychiatric:     (+) psychiatric history anxiety and depression      Infectious Disease:         Malignancy:         Other:                                 Lab Results   Component Value Date    WBC 4.6 02/08/2019    HGB 8.9 (L) 02/08/2019    HCT 27.1 (L) 02/08/2019     02/08/2019     (L) 02/08/2019    POTASSIUM 3.9 02/08/2019    CHLORIDE 98 02/08/2019    CO2 28 02/08/2019    BUN 15 02/08/2019    CR 0.81 02/08/2019    GLC 94 02/08/2019    RADHA 9.4 02/08/2019    ALBUMIN 3.6 02/08/2019    PROTTOTAL 6.9 02/08/2019     (H) 02/08/2019    AST 76 (H) 02/08/2019    ALKPHOS 332 (H) 02/08/2019    BILITOTAL 0.6 02/08/2019    LIPASE 170 02/08/2019    TSH 1.91 02/03/2019       Preop Vitals  BP Readings from Last 3 Encounters:   02/08/19 126/80   02/04/19 122/68   02/02/19 135/69    Pulse Readings from Last 3 Encounters:   02/08/19 76   02/04/19 80   02/02/19 71      Resp Readings from Last 3 Encounters:   02/04/19 18   02/02/19 (!) 7   01/13/06 16    SpO2 Readings from Last 3 Encounters:   02/04/19 99%   02/02/19 100%   01/03/19 99%      Temp Readings from Last 1 Encounters:   02/08/19 36.4  C (97.5  F) (Tympanic)    Ht Readings from Last 1 Encounters:   02/08/19 1.626 m (5' 4\")      Wt Readings from Last 1 Encounters:   02/08/19 69.9 kg (154 lb)    Estimated body mass index is 26.43 kg/m  as calculated from the following:    Height as of 2/8/19: 1.626 m (5' 4\").    Weight as " of 2/8/19: 69.9 kg (154 lb).                   LATESHA Mccoy CRNA

## 2019-02-19 ENCOUNTER — ANESTHESIA (OUTPATIENT)
Dept: SURGERY | Facility: CLINIC | Age: 64
End: 2019-02-19

## 2019-02-19 ENCOUNTER — SURGERY (OUTPATIENT)
Age: 64
End: 2019-02-19
Payer: COMMERCIAL

## 2019-02-26 NOTE — RESULT ENCOUNTER NOTE
Please Notify Mery  of test results of Holter monitor results were within normal limit no further intervention at this time continue to monitor if symptoms persist should be re-seen.    Evette Franco CNP

## 2019-02-27 DIAGNOSIS — D64.9 LOW HEMOGLOBIN: ICD-10-CM

## 2019-02-27 DIAGNOSIS — R74.8 ELEVATED LIVER ENZYMES: ICD-10-CM

## 2019-02-27 LAB
ALBUMIN SERPL-MCNC: 3.8 G/DL (ref 3.4–5)
ALP SERPL-CCNC: 178 U/L (ref 40–150)
ALT SERPL W P-5'-P-CCNC: 39 U/L (ref 0–50)
AST SERPL W P-5'-P-CCNC: 19 U/L (ref 0–45)
BASOPHILS # BLD AUTO: 0 10E9/L (ref 0–0.2)
BASOPHILS NFR BLD AUTO: 0.6 %
BILIRUB DIRECT SERPL-MCNC: 0.1 MG/DL (ref 0–0.2)
BILIRUB SERPL-MCNC: 0.4 MG/DL (ref 0.2–1.3)
CANCER AG125 SERPL-ACNC: <5 U/ML (ref 0–30)
DIFFERENTIAL METHOD BLD: ABNORMAL
EOSINOPHIL # BLD AUTO: 0.1 10E9/L (ref 0–0.7)
EOSINOPHIL NFR BLD AUTO: 3.3 %
ERYTHROCYTE [DISTWIDTH] IN BLOOD BY AUTOMATED COUNT: 11.7 % (ref 10–15)
HCT VFR BLD AUTO: 31.7 % (ref 35–47)
HGB BLD-MCNC: 10.2 G/DL (ref 11.7–15.7)
LYMPHOCYTES # BLD AUTO: 1.7 10E9/L (ref 0.8–5.3)
LYMPHOCYTES NFR BLD AUTO: 47.5 %
MCH RBC QN AUTO: 32.6 PG (ref 26.5–33)
MCHC RBC AUTO-ENTMCNC: 32.2 G/DL (ref 31.5–36.5)
MCV RBC AUTO: 101 FL (ref 78–100)
MONOCYTES # BLD AUTO: 0.4 10E9/L (ref 0–1.3)
MONOCYTES NFR BLD AUTO: 10.2 %
NEUTROPHILS # BLD AUTO: 1.4 10E9/L (ref 1.6–8.3)
NEUTROPHILS NFR BLD AUTO: 38.4 %
PLATELET # BLD AUTO: 333 10E9/L (ref 150–450)
PROT SERPL-MCNC: 7 G/DL (ref 6.8–8.8)
RBC # BLD AUTO: 3.13 10E12/L (ref 3.8–5.2)
WBC # BLD AUTO: 3.6 10E9/L (ref 4–11)

## 2019-02-27 PROCEDURE — 36415 COLL VENOUS BLD VENIPUNCTURE: CPT | Performed by: NURSE PRACTITIONER

## 2019-02-27 PROCEDURE — 86803 HEPATITIS C AB TEST: CPT | Performed by: NURSE PRACTITIONER

## 2019-02-27 PROCEDURE — 87340 HEPATITIS B SURFACE AG IA: CPT | Performed by: NURSE PRACTITIONER

## 2019-02-27 PROCEDURE — 86304 IMMUNOASSAY TUMOR CA 125: CPT | Performed by: NURSE PRACTITIONER

## 2019-02-27 PROCEDURE — 85025 COMPLETE CBC W/AUTO DIFF WBC: CPT | Performed by: NURSE PRACTITIONER

## 2019-02-27 PROCEDURE — 86708 HEPATITIS A ANTIBODY: CPT | Performed by: NURSE PRACTITIONER

## 2019-02-27 PROCEDURE — 80076 HEPATIC FUNCTION PANEL: CPT | Performed by: NURSE PRACTITIONER

## 2019-02-28 LAB
HAV IGG SER QL IA: NONREACTIVE
HBV SURFACE AG SERPL QL IA: NONREACTIVE
HCV AB SERPL QL IA: NONREACTIVE

## 2019-03-11 ENCOUNTER — TRANSFERRED RECORDS (OUTPATIENT)
Dept: HEALTH INFORMATION MANAGEMENT | Facility: CLINIC | Age: 64
End: 2019-03-11

## 2019-03-11 ENCOUNTER — TELEPHONE (OUTPATIENT)
Dept: FAMILY MEDICINE | Facility: CLINIC | Age: 64
End: 2019-03-11

## 2019-03-11 DIAGNOSIS — F41.9 ANXIETY: Primary | ICD-10-CM

## 2019-03-11 RX ORDER — HYDROXYZINE HYDROCHLORIDE 25 MG/1
25 TABLET, FILM COATED ORAL EVERY 6 HOURS PRN
Qty: 60 TABLET | Refills: 0 | Status: SHIPPED | OUTPATIENT
Start: 2019-03-11 | End: 2020-02-05

## 2019-03-11 NOTE — TELEPHONE ENCOUNTER
Hydroxyzine Pamote 25mg  is on a backoreder- patient had requested us to ask you for something else. Please fax us a new script as soon as you can.    Thanks,     Thank You,   Ivy Oneill [facility] [place]

## 2019-03-13 ENCOUNTER — HOSPITAL ENCOUNTER (OUTPATIENT)
Dept: PHYSICAL THERAPY | Facility: CLINIC | Age: 64
Setting detail: THERAPIES SERIES
End: 2019-03-13
Attending: ORTHOPAEDIC SURGERY
Payer: COMMERCIAL

## 2019-03-13 PROCEDURE — 97110 THERAPEUTIC EXERCISES: CPT | Mod: GP | Performed by: PHYSICAL THERAPIST

## 2019-03-13 PROCEDURE — 97162 PT EVAL MOD COMPLEX 30 MIN: CPT | Mod: GP | Performed by: PHYSICAL THERAPIST

## 2019-03-13 PROCEDURE — 97161 PT EVAL LOW COMPLEX 20 MIN: CPT | Mod: GP | Performed by: PHYSICAL THERAPIST

## 2019-03-14 NOTE — PROGRESS NOTES
03/13/19 1500   General Information   Type of Visit Initial OP Ortho PT Evaluation   Start of Care Date 03/13/19   Referring Physician Rosalio Lou MD   Patient/Family Goals Statement get back to work asap, garden    Orders Evaluate and Treat   Orders Comment please work on gentle passive and active assist   Date of Order 03/11/19   Insurance Type [employer]   Insurance Comments/Visits Authorized [id number]   Medical Diagnosis R humeral shaft fracture   Surgical/Medical history reviewed Yes   Precautions/Limitations no known precautions/limitations   Body Part(s)   Body Part(s) Shoulder;Elbow/Wrist;Cervical Spine   Presentation and Etiology   Pertinent history of current problem (include personal factors and/or comorbidities that impact the POC) Pt relates she fell and fractured arm on 2/2/19 due to low BP. Pt relates she was in a splint for 6 weeks but can now wean out of it(pt not wearing but brings upper arm splint) Pt relates she has not had surgery has they are concerned about further ulnar nerve damage if they were to do surgery. Pt had xray on 3/11/19 and the bone is starting to heal. Pt relates she does not believes she is under any precautions at this time. Pt relates MD gave her table walks for ROM. Pt does c/o of cracking around fx site at home. Pt also gets burning into her R hand and is very weak. Pt is R hand dominant. Pt denies having a CT scan/MRI to know nerve/muscle damage. Pt will see Dr. Lou in 6 weeks. 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 Pain;B. Decreased WB tolerance;C. Swelling;D. Decreased ROM;E. Decreased flexibility;F. Decreased strength and endurance;J. Burning;K. Numbness;L. Tingling   Functional Limitations perform activities of daily living;perform required work activities;perform desired leisure / sports activities   Symptom Location R arm    How/Where did it occur With a fall   Onset date of current episode/exacerbation 02/02/19   Chronicity New   Pain rating (0-10 point scale) Best (/10);Worst (/10)   Best (/10) 2   Worst (/10) 6   Pain quality E. Shooting   Frequency of pain/symptoms C. With activity   Pain/symptoms exacerbated by C. Lifting;D. Carrying;G. Certain positions;H. Overhead reach;I. Bending;J. ADL;K. Home tasks;L. Work tasks   Pain/symptoms eased by A. Sitting;C. Rest   Progression of symptoms since onset: Improved   Current Level of Function   Current Community Support Family/friend caregiver   Patient role/employment history A. 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 No   Have you fallen and had an injury in the past year? Yes   Timed Up and Go score (seconds) 9 secs   Is patient a fall risk? No   Fall screen comments Fell due to low BP    Functional Scales   Functional Scales Other   Other Scales  SPADI - 86%   Cervical Spine   Elbow Flexion (C5, C6) Strength 4/5 - pt relates bending arm at home to help w ADL's   Elbow Extension (C7) Strength 4/5   Wrist Extension (C6) Strength 5/5   Wrist Flexion (C7) Strength 5/5   Shoulder/Wrist/Hand Strength Comments  strength: R:22 lbs  L: 70 lbs   UE Neural Tension UE Neural Tension Tests  (-unable to test due to pain )   Shoulder Objective Findings   Side (if bilateral, select both right and left) Right   Observation able to ambulate, small arm swing noted   Cervical Screen (ROM, quadrant) WFL - pt relates min stiff   Shoulder Flexibility Comments During MMT stabilized above fracture, unable to complete special test due to pain with PROM   Right Shoulder Flexion AROM 30   Right Shoulder Flexion PROM 90 - w sharp pain and cracking noted despite stabilization from therapist - pt relates also happens at home, gets sharp pain but  then disipates   Right Shoulder Abduction AROM 20   Right Shoulder Abduction PROM 90   Right Shoulder ER AROM R shoulder   Right Shoulder IR AROM R hip    Right Shoulder Flexion Strength 2/5 - tested above fx   Right Shoulder Abduction Strength 2/5 - tested above fx   Right Shoulder ER Strength 1/5   Right Shoulder IR Strength 1/5   Elbow/Wrist Objective Findings   Side (if bilateral, select both right and left) Right   Phalen's Test -   Reverse Phalen's Test -   Right Elbow Flexion/Extension AROM visual estimate: 0- - feels stiff at end ranges   Planned Therapy Interventions   Planned Therapy Interventions joint mobilization;manual therapy;neuromuscular re-education;ROM;strengthening;stretching   Planned Modality Interventions   Planned Modality Interventions Cryotherapy;Electrical stimulation;Hot packs;TENS   Clinical Impression   Criteria for Skilled Therapeutic Interventions Met yes, treatment indicated   PT Diagnosis R humeral fx   Influenced by the following impairments limited ROM, weakness, pain   Functional limitations due to impairments reaching, ADL's, driving   Clinical Presentation Evolving/Changing   Clinical Presentation Rationale Pt is pleasant 63 yr old female however fx healing slowly and pt relates cracking. Pt is motivated to get better   Clinical Decision Making (Complexity) Moderate complexity   Therapy Frequency 2 times/Week   Predicted Duration of Therapy Intervention (days/wks) 10 weeks   Risk & Benefits of therapy have been explained Yes   Patient, Family & other staff in agreement with plan of care Yes   Education Assessment   Preferred Learning Style Reading;Listening;Demonstration;Pictures/video   Barriers to Learning No barriers   ORTHO GOALS   PT Ortho Eval Goals 1;3;2;4   Ortho Goal 1   Goal Identifier Shoulder PROM   Goal Description Pt will demonstrate full R shoulder PROM to be able to don/doff clothes w less pain   Target Date 04/18/19   Ortho Goal 2   Goal Identifier  Shoulder AROM   Goal Description Pt will demonstrate 180 deg GH AROM flex w less than 1/10 pain to allow him to reach overhead  into cupboard to put dishes away without pain   Target Date 04/18/19   Ortho Goal 3   Goal Identifier lifiting   Goal Description Pt will demonstrate 5/5 R UE strength to be able to complete all lifting/transfers at work   Target Date 05/23/19   Ortho Goal 4   Goal Identifier SPADI   Goal Description Pt will report <10% disability on SPADI to demonstrate improved ability to complete daily activities and demonstrate significant clinical improvement   Target Date 05/23/19   Total Evaluation Time   PT Eval, Moderate Complexity Minutes (15204) 30       Tuyet Harmon  Physical Therapist  [address] [address]  [email or web address]   [email or web address]   Office: [phone] Fax: [phone]

## 2019-03-15 ENCOUNTER — HOSPITAL ENCOUNTER (OUTPATIENT)
Dept: PHYSICAL THERAPY | Facility: CLINIC | Age: 64
Setting detail: THERAPIES SERIES
End: 2019-03-15
Attending: ORTHOPAEDIC SURGERY
Payer: COMMERCIAL

## 2019-03-15 PROCEDURE — 97110 THERAPEUTIC EXERCISES: CPT | Mod: GP | Performed by: PHYSICAL THERAPIST

## 2019-03-21 ENCOUNTER — HOSPITAL ENCOUNTER (OUTPATIENT)
Dept: PHYSICAL THERAPY | Facility: CLINIC | Age: 64
Setting detail: THERAPIES SERIES
End: 2019-03-21
Attending: ORTHOPAEDIC SURGERY
Payer: COMMERCIAL

## 2019-03-21 PROCEDURE — 97110 THERAPEUTIC EXERCISES: CPT | Mod: GP | Performed by: PHYSICAL THERAPIST

## 2019-04-01 ENCOUNTER — HOSPITAL ENCOUNTER (OUTPATIENT)
Dept: PHYSICAL THERAPY | Facility: CLINIC | Age: 64
Setting detail: THERAPIES SERIES
End: 2019-04-01
Attending: ORTHOPAEDIC SURGERY
Payer: COMMERCIAL

## 2019-04-01 PROCEDURE — 97110 THERAPEUTIC EXERCISES: CPT | Mod: GP | Performed by: PHYSICAL THERAPIST

## 2019-04-01 PROCEDURE — 97140 MANUAL THERAPY 1/> REGIONS: CPT | Mod: GP | Performed by: PHYSICAL THERAPIST

## 2019-04-08 ENCOUNTER — HOSPITAL ENCOUNTER (OUTPATIENT)
Dept: PHYSICAL THERAPY | Facility: CLINIC | Age: 64
Setting detail: THERAPIES SERIES
End: 2019-04-08
Attending: ORTHOPAEDIC SURGERY
Payer: COMMERCIAL

## 2019-04-08 PROCEDURE — 97110 THERAPEUTIC EXERCISES: CPT | Mod: GP | Performed by: PHYSICAL THERAPIST

## 2019-04-08 PROCEDURE — 97140 MANUAL THERAPY 1/> REGIONS: CPT | Mod: GP | Performed by: PHYSICAL THERAPIST

## 2019-04-18 ENCOUNTER — HOSPITAL ENCOUNTER (OUTPATIENT)
Dept: PHYSICAL THERAPY | Facility: CLINIC | Age: 64
Setting detail: THERAPIES SERIES
End: 2019-04-18
Attending: ORTHOPAEDIC SURGERY
Payer: COMMERCIAL

## 2019-04-18 PROCEDURE — 97535 SELF CARE MNGMENT TRAINING: CPT | Mod: GP | Performed by: PHYSICAL THERAPIST

## 2019-04-18 PROCEDURE — 97110 THERAPEUTIC EXERCISES: CPT | Mod: GP | Performed by: PHYSICAL THERAPIST

## 2019-04-24 ENCOUNTER — HOSPITAL ENCOUNTER (OUTPATIENT)
Dept: CT IMAGING | Facility: CLINIC | Age: 64
Discharge: HOME | End: 2019-04-24
Attending: ORTHOPAEDIC SURGERY | Admitting: ORTHOPAEDIC SURGERY
Payer: COMMERCIAL

## 2019-04-24 DIAGNOSIS — M25.519 SHOULDER PAIN: ICD-10-CM

## 2019-04-24 PROCEDURE — 73200 CT UPPER EXTREMITY W/O DYE: CPT | Mod: RT

## 2019-04-29 ENCOUNTER — TRANSFERRED RECORDS (OUTPATIENT)
Dept: HEALTH INFORMATION MANAGEMENT | Facility: CLINIC | Age: 64
End: 2019-04-29

## 2019-04-29 DIAGNOSIS — R74.8 ELEVATED LIVER ENZYMES: ICD-10-CM

## 2019-04-29 DIAGNOSIS — M25.519 SHOULDER PAIN: Primary | ICD-10-CM

## 2019-04-29 DIAGNOSIS — R74.8 ELEVATED LIVER ENZYMES: Primary | ICD-10-CM

## 2019-04-29 LAB
ALBUMIN SERPL-MCNC: 3.6 G/DL (ref 3.4–5)
ALBUMIN SERPL-MCNC: 3.7 G/DL (ref 3.4–5)
ALP SERPL-CCNC: 314 U/L (ref 40–150)
ALT SERPL W P-5'-P-CCNC: 157 U/L (ref 0–50)
AST SERPL W P-5'-P-CCNC: 76 U/L (ref 0–45)
BILIRUB DIRECT SERPL-MCNC: <0.1 MG/DL (ref 0–0.2)
BILIRUB SERPL-MCNC: 0.4 MG/DL (ref 0.2–1.3)
CALCIUM SERPL-MCNC: 9.4 MG/DL (ref 8.5–10.1)
PROT SERPL-MCNC: 7.3 G/DL (ref 6.8–8.8)

## 2019-04-29 PROCEDURE — 36415 COLL VENOUS BLD VENIPUNCTURE: CPT | Performed by: ORTHOPAEDIC SURGERY

## 2019-04-29 PROCEDURE — 82040 ASSAY OF SERUM ALBUMIN: CPT | Mod: 59 | Performed by: ORTHOPAEDIC SURGERY

## 2019-04-29 PROCEDURE — 80076 HEPATIC FUNCTION PANEL: CPT | Performed by: NURSE PRACTITIONER

## 2019-04-29 PROCEDURE — 82306 VITAMIN D 25 HYDROXY: CPT | Performed by: ORTHOPAEDIC SURGERY

## 2019-04-29 PROCEDURE — 82310 ASSAY OF CALCIUM: CPT | Performed by: ORTHOPAEDIC SURGERY

## 2019-04-30 LAB — DEPRECATED CALCIDIOL+CALCIFEROL SERPL-MC: 66 UG/L (ref 20–75)

## 2019-05-01 ENCOUNTER — OFFICE VISIT (OUTPATIENT)
Dept: FAMILY MEDICINE | Facility: CLINIC | Age: 64
End: 2019-05-01
Payer: COMMERCIAL

## 2019-05-01 VITALS
DIASTOLIC BLOOD PRESSURE: 78 MMHG | HEIGHT: 64 IN | SYSTOLIC BLOOD PRESSURE: 124 MMHG | HEART RATE: 72 BPM | BODY MASS INDEX: 25.27 KG/M2 | TEMPERATURE: 97.6 F | RESPIRATION RATE: 18 BRPM | WEIGHT: 148 LBS

## 2019-05-01 DIAGNOSIS — S42.324G: ICD-10-CM

## 2019-05-01 DIAGNOSIS — Z01.818 PREOP GENERAL PHYSICAL EXAM: Primary | ICD-10-CM

## 2019-05-01 LAB
BASOPHILS # BLD AUTO: 0 10E9/L (ref 0–0.2)
BASOPHILS NFR BLD AUTO: 1 %
CHOLEST SERPL-MCNC: 229 MG/DL
DIFFERENTIAL METHOD BLD: ABNORMAL
EOSINOPHIL # BLD AUTO: 0.1 10E9/L (ref 0–0.7)
EOSINOPHIL NFR BLD AUTO: 3.4 %
ERYTHROCYTE [DISTWIDTH] IN BLOOD BY AUTOMATED COUNT: 11.5 % (ref 10–15)
HCT VFR BLD AUTO: 33.4 % (ref 35–47)
HDLC SERPL-MCNC: 98 MG/DL
HGB BLD-MCNC: 11 G/DL (ref 11.7–15.7)
LDLC SERPL CALC-MCNC: 120 MG/DL
LYMPHOCYTES # BLD AUTO: 1.6 10E9/L (ref 0.8–5.3)
LYMPHOCYTES NFR BLD AUTO: 40.6 %
MCH RBC QN AUTO: 32.4 PG (ref 26.5–33)
MCHC RBC AUTO-ENTMCNC: 32.9 G/DL (ref 31.5–36.5)
MCV RBC AUTO: 98 FL (ref 78–100)
MONOCYTES # BLD AUTO: 0.5 10E9/L (ref 0–1.3)
MONOCYTES NFR BLD AUTO: 13.4 %
NEUTROPHILS # BLD AUTO: 1.6 10E9/L (ref 1.6–8.3)
NEUTROPHILS NFR BLD AUTO: 41.6 %
NONHDLC SERPL-MCNC: 131 MG/DL
PLATELET # BLD AUTO: 326 10E9/L (ref 150–450)
RBC # BLD AUTO: 3.4 10E12/L (ref 3.8–5.2)
TRIGL SERPL-MCNC: 57 MG/DL
WBC # BLD AUTO: 3.8 10E9/L (ref 4–11)

## 2019-05-01 PROCEDURE — 36415 COLL VENOUS BLD VENIPUNCTURE: CPT | Performed by: NURSE PRACTITIONER

## 2019-05-01 PROCEDURE — 80061 LIPID PANEL: CPT | Performed by: NURSE PRACTITIONER

## 2019-05-01 PROCEDURE — 99214 OFFICE O/P EST MOD 30 MIN: CPT | Performed by: NURSE PRACTITIONER

## 2019-05-01 PROCEDURE — 85025 COMPLETE CBC W/AUTO DIFF WBC: CPT | Performed by: NURSE PRACTITIONER

## 2019-05-01 ASSESSMENT — MIFFLIN-ST. JEOR: SCORE: 1221.32

## 2019-05-01 NOTE — PROGRESS NOTES
[facility]  [address] [place] [id number]  [phone]  Dept: [phone]    PRE-OP EVALUATION:  Today's date: 2019    Mery Sullivan (: 1955) presents for pre-operative evaluation assessment as requested by Dr. Lou.  She requires evaluation and anesthesia risk assessment prior to undergoing surgery/procedure for treatment of right humeral shaft fracture .    Fax number for surgical facility: [phone]  Primary Physician: Evette Franco  Type of Anesthesia Anticipated: Block    Patient has a Health Care Directive or Living Will:  NO    Preop Questions 2019   Who is doing your surgery? Dr Mamadou Lou   What are you having done? ORIF RIGHT HUMERAL SHAFT FRACTURE   Date of Surgery/Procedure: 5-3-2019   Facility or Hospital where procedure/surgery will be performed: [facility]   1.  Do you have a history of Heart attack, stroke, stent, coronary bypass surgery, or other heart surgery? No   2.  Do you ever have any pain or discomfort in your chest? No   3.  Do you have a history of  Heart Failure? No   4.   Are you troubled by shortness of breath when:  walking on a level surface, or up a slight hill, or at night? No   5.  Do you currently have a cold, bronchitis or other respiratory infection? No   6.  Do you have a cough, shortness of breath, or wheezing? No   7.  Do you sometimes get pains in the calves of your legs when you walk? No   8. Do you or anyone in your family have previous history of blood clots? No   9.  Do you or does anyone in your family have a serious bleeding problem such as prolonged bleeding following surgeries or cuts? No   10. Have you ever had problems with anemia or been told to take iron pills? YES    11. Have you had any abnormal blood loss such as black, tarry or bloody stools, or abnormal vaginal bleeding? No   12. Have you ever had a blood transfusion? No   13. Have you or any of your relatives ever had  problems with anesthesia? No   14. Do you have sleep apnea, excessive snoring or daytime drowsiness? No   15. Do you have any prosthetic heart valves? No   16. Do you have prosthetic joints? No   17. Is there any chance that you may be pregnant? No         HPI:     HPI related to upcoming procedure: ORIF RIGHT HUMERAL SHAFT FRACTURE      HYPERLIPIDEMIA - Patient has a long history of significant Hyperlipidemia requiring medication for treatment with recent good control. Patient reports no problems or side effects with the medication.                                                                                                                                                       .  HYPERTENSION - Patient has longstanding history of HTN , currently denies any symptoms referable to elevated blood pressure. Specifically denies chest pain, palpitations, dyspnea, orthopnea, PND or peripheral edema. Blood pressure readings have been in normal range. Current medication regimen is as listed below. Patient denies any side effects of medication.                                                                                                                                                                                          .    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     Priority: Medium     Generalized anxiety disorder 06/26/2013     Priority: Medium     Hyperlipidemia 06/26/2013     Priority: Medium     Dysthymic disorder 05/29/2012     Priority: Medium     Essential hypertension, benign 07/07/2006     Priority: Medium     Dyazide gave leg cramps  Lisinopril caused chest discomfort  Atenolol caused cold hands but was effective for BP at low dose  ARBs were only available by brand and pt requests generic for cost so started on calcium channel blocker 11/06        Past Medical History:   Diagnosis  Date     Anemia, unspecified      Unspecified essential hypertension      No past surgical history on file.  Current Outpatient Medications   Medication Sig Dispense Refill     atorvastatin (LIPITOR) 40 MG tablet Take 40 mg by mouth daily       calcium carbonate 600 mg-vitamin D 400 units (CALTRATE) 600-400 MG-UNIT per tablet Take 1 tablet by mouth 2 times daily       FERROUS GLUCONATE 325 MG OR TABS 1 TABLET DAILY       FLUoxetine (PROZAC) 10 MG capsule Take 10 mg by mouth every morning       hydrOXYzine (ATARAX) 25 MG tablet Take 1 tablet (25 mg) by mouth every 6 hours as needed for anxiety or other (insomina) 60 tablet 0     Inulin 1.5 g CHEW Take 2 tablets by mouth daily       losartan (COZAAR) 50 MG tablet Take 1 tablet (50 mg) by mouth 2 times daily 60 tablet 0     Multiple Vitamin (MULTI-VITAMINS) TABS Take 1 tablet by mouth daily       oxyCODONE-acetaminophen (PERCOCET) 5-325 MG tablet Take 1-2 tablets by mouth every 4 hours as needed for severe pain 20 tablet 0     vitamin C (ASCORBIC ACID) 100 MG tablet Take 100 mg by mouth daily       OTC products: no recent use of OTC ASA, NSAIDS or Steroids    Allergies   Allergen Reactions     Atenolol      Cold hands and feet     Dyazide [Hydrochlorothiazide W-Triamterene]      Caused cramps     Lisinopril      Worked for blood pressure but over time caused chest discomfort     Sulfa Drugs Rash     fever      Latex Allergy: NO    Social History     Tobacco Use     Smoking status: Never Smoker     Smokeless tobacco: Never Used   Substance Use Topics     Alcohol use: Yes     Comment: occ     History   Drug Use No       REVIEW OF SYSTEMS:   Constitutional, neuro, ENT, endocrine, pulmonary, cardiac, gastrointestinal, genitourinary, musculoskeletal, integument and psychiatric systems are negative, except as otherwise noted.    EXAM:   [facility] 06/16/2006     GENERAL APPEARANCE: healthy, alert and no distress     EYES: EOMI, PERRL     HENT: ear canals and TM's normal and nose  and mouth without ulcers or lesions     NECK: no adenopathy, no asymmetry, masses, or scars and thyroid normal to palpation     RESP: lungs clear to auscultation - no rales, rhonchi or wheezes     CV: regular rates and rhythm, normal S1 S2, no S3 or S4 and no murmur, click or rub     ABDOMEN:  soft, nontender, no HSM or masses and bowel sounds normal     MS: extremities normal- no gross deformities noted, no evidence of inflammation in joints, FROM in all extremities.     SKIN: no suspicious lesions or rashes     NEURO: Normal strength and tone, sensory exam grossly normal, mentation intact and speech normal     PSYCH: mentation appears normal. and affect normal/bright     LYMPHATICS: No cervical adenopathy    DIAGNOSTICS:     Results for orders placed or performed in visit on 05/01/19   Lipid Profile (Chol, Trig, HDL, LDL calc)   Result Value Ref Range    Cholesterol 229 (H) <200 mg/dL    Triglycerides 57 <150 mg/dL    HDL Cholesterol 98 >49 mg/dL    LDL Cholesterol Calculated 120 (H) <100 mg/dL    Non HDL Cholesterol 131 (H) <130 mg/dL   CBC with platelets differential   Result Value Ref Range    WBC 3.8 (L) 4.0 - 11.0 10e9/L    RBC Count 3.40 (L) 3.8 - 5.2 10e12/L    Hemoglobin 11.0 (L) 11.7 - 15.7 g/dL    Hematocrit 33.4 (L) 35.0 - 47.0 %    MCV 98 78 - 100 fl    MCH 32.4 26.5 - 33.0 pg    MCHC 32.9 31.5 - 36.5 g/dL    RDW 11.5 10.0 - 15.0 %    Platelet Count 326 150 - 450 10e9/L    % Neutrophils 41.6 %    % Lymphocytes 40.6 %    % Monocytes 13.4 %    % Eosinophils 3.4 %    % Basophils 1.0 %    Absolute Neutrophil 1.6 1.6 - 8.3 10e9/L    Absolute Lymphocytes 1.6 0.8 - 5.3 10e9/L    Absolute Monocytes 0.5 0.0 - 1.3 10e9/L    Absolute Eosinophils 0.1 0.0 - 0.7 10e9/L    Absolute Basophils 0.0 0.0 - 0.2 10e9/L    Diff Method Automated Method          Recent Labs   Lab Test 02/27/19  0902 02/08/19  1053  02/04/19  0511   HGB 10.2* 8.9*   < > 8.7*    295   < > 216   NA  --  132*  --  134   POTASSIUM  --  3.9   --  3.8   CR  --  0.81  --  0.90   A1C  --  5.1  --   --     < > = values in this interval not displayed.        IMPRESSION:   Reason for surgery/procedure: ORIF RIGHT HUMERAL SHAFT FRACTURE    The proposed surgical procedure is considered LOW risk.    REVISED CARDIAC RISK INDEX  The patient has the following serious cardiovascular risks for perioperative complications such as (MI, PE, VFib and 3  AV Block):  No serious cardiac risks  INTERPRETATION: 0 risks: Class I (very low risk - 0.4% complication rate)    The patient has the following additional risks for perioperative complications:  No identified additional risks      ICD-10-CM    1. Preop general physical exam Z01.818 Lipid Profile (Chol, Trig, HDL, LDL calc)     CBC with platelets differential   2. Closed nondisplaced transverse fracture of shaft of right humerus with delayed healing, subsequent encounter S42.324G        RECOMMENDATIONS:     --Consult hospital rounder / IM to assist post-op medical management    --Patient is to take all scheduled medications on the day of surgery EXCEPT for modifications listed below.    APPROVAL GIVEN to proceed with proposed procedure, without further diagnostic evaluation       Signed Electronically by: LATESHA Venegas CNP    Copy of this evaluation report is provided to requesting physician.    [facility] Preop Guidelines    Revised Cardiac Risk Index

## 2019-05-02 NOTE — ADDENDUM NOTE
Encounter addended by: Tuyet Harmon, PT on: 5/2/2019 9:54 AM   Actions taken: Pend clinical note, Sign clinical note, Flowsheet accepted, Episode resolved

## 2019-05-02 NOTE — PROGRESS NOTES
Outpatient Physical Therapy Discharge Note     Patient: Mery Sullivan  : 1955    Beginning/End Dates of Reporting Period:  3/13/19 to 2019    Referring Provider: Rosalio Lou MD    Therapy Diagnosis: R humeral fx     Client Self Report: Pt saw MD and fracture is not healing. Pt will got back to work on 5/15/19 but is on 5 lbs lifting restrictions. Pt relates MD did express concern over nerve thus did not want to avoid surgery at this time. Pt may have CT scan at the end of May. Pt is still wearing brace and is to continue to wear as needed.     Objective Measurements:  Objective Measure: R Shoulder PROM  Details: flex: 100 -limited due to pain, abd: 105 - limited due to pain , IR: 60, ER: 90    Objective Measure: R shoulder AROM w brace   Details: NT       Goals:  Goal Identifier Shoulder PROM   Goal Description Pt will demonstrate full R shoulder PROM to be able to don/doff clothes w less pain   Target Date 19   Date Met      Progress:not met     Goal Identifier Shoulder AROM   Goal Description Pt will demonstrate 180 deg GH AROM flex w less than 1/10 pain to allow him to reach overhead  into cupboard to put dishes away without pain   Target Date 19   Date Met      Progress:not met     Goal Identifier lifiting   Goal Description Pt will demonstrate 5/5 R UE strength to be able to complete all lifting/transfers at work   Target Date 19   Date Met      Progress:not met     Goal Identifier SPADI   Goal Description Pt will report <10% disability on SPADI to demonstrate improved ability to complete daily activities and demonstrate significant clinical improvement   Target Date 19   Date Met      Progress:not assessed at last visit     Progress Toward Goals:   Progress this reporting period: Pt had CT and bone is not healing thus pt will have surgery on 5/3/19. Due to change in medical status pt is being discharged from PT at this time. PROM and strength is still very limited at this  time due to fracture.       Plan:  Discharge from therapy.    Discharge:    Reason for Discharge: Change in medical status.    Equipment Issued: NA    Discharge Plan: Patient to continue home program.    Tuyet Harmon  Physical Therapist  [facility]  [address] [place] [id number]  [email or web address]   [email or web address]   Office: [phone] Fax: [phone]

## 2019-05-07 ENCOUNTER — TELEPHONE (OUTPATIENT)
Dept: FAMILY MEDICINE | Facility: CLINIC | Age: 64
End: 2019-05-07

## 2019-05-07 DIAGNOSIS — R74.8 ELEVATED LIVER ENZYMES: ICD-10-CM

## 2019-05-07 DIAGNOSIS — E78.5 HYPERLIPIDEMIA LDL GOAL <130: Primary | ICD-10-CM

## 2019-05-07 RX ORDER — EZETIMIBE 10 MG/1
10 TABLET ORAL DAILY
Qty: 90 TABLET | Refills: 3 | Status: SHIPPED | OUTPATIENT
Start: 2019-05-07 | End: 2021-03-29 | Stop reason: SINTOL

## 2019-05-07 NOTE — TELEPHONE ENCOUNTER
Patient presently on Lipitor patient and liver enzymes were noted to be elevated 3 months ago for work-up was done minus ultrasound of the liver patient declined.  Hepatitis panel was negative.  Repeat liver enzymes after being off his statins for 3 to 4 weeks were back within normal limits patient was restarted on her Lipitor liver enzymes obtained from 4/29/2019 show elevated liver enzymes.  Patient is unable to tolerate statins patient's Lipitor was discontinued due to repeat elevated liver enzymes while on the medication.    Lipid panel show increased cholesterol and increased LDL  coronary risk factors to indicate a lipid lowering medication.  Did work with pharmacy. Patient will be started on Zetia which is indicated to unable to tolerate statins.    Patient will have repeat liver enzymes in 2 to 3 weeks and repeat lipids in 6 months.    Evette Franco CNP

## 2019-05-16 ENCOUNTER — TRANSFERRED RECORDS (OUTPATIENT)
Dept: HEALTH INFORMATION MANAGEMENT | Facility: CLINIC | Age: 64
End: 2019-05-16

## 2019-06-11 ENCOUNTER — TRANSFERRED RECORDS (OUTPATIENT)
Dept: HEALTH INFORMATION MANAGEMENT | Facility: CLINIC | Age: 64
End: 2019-06-11

## 2019-06-17 ENCOUNTER — HOSPITAL ENCOUNTER (OUTPATIENT)
Dept: PHYSICAL THERAPY | Facility: CLINIC | Age: 64
Setting detail: THERAPIES SERIES
End: 2019-06-17
Attending: ORTHOPAEDIC SURGERY
Payer: COMMERCIAL

## 2019-06-17 DIAGNOSIS — R74.8 ELEVATED LIVER ENZYMES: ICD-10-CM

## 2019-06-17 LAB
ALBUMIN SERPL-MCNC: 3.9 G/DL (ref 3.4–5)
ALP SERPL-CCNC: 77 U/L (ref 40–150)
ALT SERPL W P-5'-P-CCNC: 23 U/L (ref 0–50)
AST SERPL W P-5'-P-CCNC: 18 U/L (ref 0–45)
BILIRUB DIRECT SERPL-MCNC: <0.1 MG/DL (ref 0–0.2)
BILIRUB SERPL-MCNC: 0.4 MG/DL (ref 0.2–1.3)
PROT SERPL-MCNC: 6.9 G/DL (ref 6.8–8.8)

## 2019-06-17 PROCEDURE — 97110 THERAPEUTIC EXERCISES: CPT | Mod: GP | Performed by: PHYSICAL THERAPIST

## 2019-06-17 PROCEDURE — 36415 COLL VENOUS BLD VENIPUNCTURE: CPT | Performed by: NURSE PRACTITIONER

## 2019-06-17 PROCEDURE — 80076 HEPATIC FUNCTION PANEL: CPT | Performed by: NURSE PRACTITIONER

## 2019-06-17 PROCEDURE — 97161 PT EVAL LOW COMPLEX 20 MIN: CPT | Mod: GP | Performed by: PHYSICAL THERAPIST

## 2019-06-17 NOTE — PROGRESS NOTES
06/17/19 0900   General Information   Type of Visit Initial OP Ortho PT Evaluation   Start of Care Date 06/17/19   Referring Physician Rosalio Lou MD   Patient/Family Goals Statement get back to work   Orders Evaluate and Treat   Orders Comment please work on gentle ROm and progress to strengthengint   Date of Order 06/11/19   Certification Required? No   Medical Diagnosis s.p ORIF R humeral shaft fx 5/3/19   Surgical/Medical history reviewed Yes   Precautions/Limitations no known precautions/limitations   Body Part(s)   Body Part(s) Shoulder   Presentation and Etiology   Pertinent history of current problem (include personal factors and/or comorbidities that impact the POC) Pt initially fell in Feb and fractured humerus. Pt tried PT and conservative treatment however bone failed to heal thus pt had R humuers ORIF on 5/3/19. Pt was in sling for 2 weeks after surgery but now uses as needed. Pt relates tingling is present in R hand/UE, sometimes worse than others. Pt relates she has tried stretches at home but is a little sore as she was doing some quilting. Pt denies neck pain. PMH: anemia, High BP, menopause, ORIF to R humerus.   Impairments A. Pain;C. Swelling;D. Decreased ROM;E. Decreased flexibility;F. Decreased strength and endurance;K. Numbness   Functional Limitations perform activities of daily living;perform required work activities;perform desired leisure / sports activities   Symptom Location R UE   How/Where did it occur With a fall   Onset date of current episode/exacerbation 02/02/19   Chronicity New   Pain rating (0-10 point scale) Best (/10);Worst (/10)   Best (/10) 1   Worst (/10) 7   Pain quality C. Aching   Frequency of pain/symptoms A. Constant   Pain/symptoms exacerbated by C. Lifting;D. Carrying;H. Overhead reach   Pain/symptoms eased by C. Rest;E. Changing positions;H. Cold;I. OTC medication(s)   Progression of symptoms since onset: Improved   Current Level of Function   Current Community  Support Family/friend caregiver   Patient role/employment history A. 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 No   Have you fallen and had an injury in the past year? Yes   Is patient a fall risk? No   Fall screen comments NT, assesed at last eval, not at fall risk   Abuse Screen (yes response referral indicated)   Feels Unsafe at Home or Work/School no   Feels Threatened by Someone no   Does Anyone Try to Keep You From Having Contact with Others or Doing Things Outside Your Home? no   Physical Signs of Abuse Present no   Functional Scales   Other Scales  SPADI - 47%   Shoulder Objective Findings   Side (if bilateral, select both right and left) Right   Cervical Screen (ROM, quadrant) WFL    Right Shoulder Flexion AROM 90   Right Shoulder Flexion PROM 120   Right Shoulder Abduction AROM 70   Right Shoulder Abduction PROM 100   Right Shoulder ER AROM R ear   Right Shoulder IR AROM R glute   Right Shoulder Flexion Strength 2/5   Right Shoulder Abduction Strength 2/5   Right Shoulder ER Strength 2/5   Right Shoulder IR Strength 3/5   Shoulder Flexibility Comments biceps: 4/5, elbow ROM: flex: WNL, ext: lacking approx 10 deg    Wan-Sebas Test -   Right Shoulder ER PROM 45   Right Shoulder IR PROM 0   Integumentary  large incisoitn anterior R arm - mod scar tissue   Shoulder Special Tests Comments R  strength: 35 lbs, L  strength: 65 lbs    Planned Therapy Interventions   Planned Therapy Interventions joint mobilization;manual therapy;neuromuscular re-education;ROM;strengthening;stretching;fine motor coordination training   Planned Modality Interventions   Planned Modality Interventions Cryotherapy;Electrical stimulation;Hot packs;TENS   Clinical Impression   Criteria for Skilled Therapeutic Interventions Met yes, treatment indicated   PT  Diagnosis R humeral ORIF   Influenced by the following impairments limited ROM, weakness, pain   Functional limitations due to impairments reaching, ADL's, driving   Clinical Presentation Stable/Uncomplicated   Clinical Presentation Rationale Pt is pleasant 63 yr old female s/p ORIF. Pt is motiveated to get better however has had limited ROM since injury in Feb   Clinical Decision Making (Complexity) Low complexity   Therapy Frequency 1 time/week   Predicted Duration of Therapy Intervention (days/wks) 8 weeks   Risk & Benefits of therapy have been explained Yes   Patient, Family & other staff in agreement with plan of care Yes   Education Assessment   Preferred Learning Style Reading;Listening;Demonstration;Pictures/video   Barriers to Learning No barriers   ORTHO GOALS   PT Ortho Eval Goals 2;1;3;4   Ortho Goal 1   Goal Identifier Shoulder AROM   Goal Description Pt will demonstrate full  GH AROM with less than 1/10 pain in order to be able to don/doff jacket/shirt to return to PLOF w/o  pain.   Target Date 07/15/19   Ortho Goal 2   Goal Identifier Shoulder AROM   Goal Description Pt will demonstrate 180 deg GH AROM flex w less than 1/10 pain to allow him to reach overhead  into cupboard to put dishes away without pain   Target Date 07/15/19   Ortho Goal 3   Goal Identifier lifiting   Goal Description Pt will demonstrate 5/5 R UE strength to be able to complete all lifting/transfers at work   Target Date 08/12/19   Ortho Goal 4   Goal Identifier SPADI   Goal Description Pt will report <10% disability on SPADI to demonstrate improved ability to complete daily activities and demonstrate significant clinical improvement   Target Date 08/12/19   Total Evaluation Time   PT De, Low Complexity Minutes (31251) 15     Tuyet Harmon  Physical Therapist  [address] [address]  [email or web address]   [email or web address]   Office: [phone] Fax: [phone]

## 2019-06-19 ENCOUNTER — TELEPHONE (OUTPATIENT)
Dept: FAMILY MEDICINE | Facility: CLINIC | Age: 64
End: 2019-06-19

## 2019-06-19 DIAGNOSIS — I10 BENIGN ESSENTIAL HYPERTENSION: ICD-10-CM

## 2019-06-19 DIAGNOSIS — I10 BENIGN ESSENTIAL HYPERTENSION: Primary | ICD-10-CM

## 2019-06-19 RX ORDER — AMLODIPINE BESYLATE 2.5 MG/1
2.5 TABLET ORAL DAILY
Qty: 90 TABLET | Refills: 1 | Status: SHIPPED | OUTPATIENT
Start: 2019-06-19 | End: 2019-11-12

## 2019-06-19 RX ORDER — LOSARTAN POTASSIUM 50 MG/1
TABLET ORAL
Qty: 180 TABLET | Refills: 1 | Status: SHIPPED | OUTPATIENT
Start: 2019-06-19 | End: 2020-01-03

## 2019-06-19 NOTE — TELEPHONE ENCOUNTER
With the Lipitor patient had elevated liver enzymes were discontinued and patient started on Zetia did have repeat liver enzymes once on.  Medication liver enzymes were within normal limits.  Recommendation continue with Zetia and recheck cholesterol in 6 months.    In review with patient patient's blood pressures remain in 150/90 she is currently on losartan 50 mg twice a day.  Recommend adding Norvasc at 2.5 prescription was sent to [place] pharmacy.  Patient will continue to monitor her blood pressures if they remain elevated once starting Norvasc patient will follow-up.    Evette Franco CNP

## 2019-06-19 NOTE — TELEPHONE ENCOUNTER
"Requested Prescriptions   Pending Prescriptions Disp Refills     losartan (COZAAR) 50 MG tablet [Pharmacy Med Name: LOSARTAN POTASSIUM 50MG TABS] 60 tablet 0     Sig: TAKE ONE TABLET BY MOUTH TWICE A DAY       Angiotensin-II Receptors Passed - 6/19/2019 11:49 AM        Passed - Blood pressure under 140/90 in past 12 months     BP Readings from Last 3 Encounters:   05/01/19 124/78   02/08/19 126/80   02/04/19 122/68                 Passed - Recent (12 mo) or future (30 days) visit within the authorizing provider's specialty     Patient had office visit in the last 12 months or has a visit in the next 30 days with authorizing provider or within the authorizing provider's specialty.  See \"Patient Info\" tab in inbasket, or \"Choose Columns\" in Meds & Orders section of the refill encounter.              Passed - Medication is active on med list        Passed - Patient is age 18 or older        Passed - No active pregnancy on record        Passed - Normal serum creatinine on file in past 12 months     Recent Labs   Lab Test 02/08/19  1053   CR 0.81             Passed - Normal serum potassium on file in past 12 months     Recent Labs   Lab Test 02/08/19  1053   POTASSIUM 3.9                    Passed - No positive pregnancy test in past 12 months        Last Written Prescription Date:  2/6/18  Last Fill Quantity: 60,  # refills: 0   Last office visit: 5/1/2019 with prescribing provider:     Future Office Visit:      "

## 2019-06-24 ENCOUNTER — HOSPITAL ENCOUNTER (OUTPATIENT)
Dept: PHYSICAL THERAPY | Facility: CLINIC | Age: 64
Setting detail: THERAPIES SERIES
End: 2019-06-24
Attending: ORTHOPAEDIC SURGERY
Payer: COMMERCIAL

## 2019-06-24 PROCEDURE — 97110 THERAPEUTIC EXERCISES: CPT | Mod: GP | Performed by: PHYSICAL THERAPIST

## 2019-07-01 ENCOUNTER — HOSPITAL ENCOUNTER (OUTPATIENT)
Dept: PHYSICAL THERAPY | Facility: CLINIC | Age: 64
Setting detail: THERAPIES SERIES
End: 2019-07-01
Attending: ORTHOPAEDIC SURGERY
Payer: COMMERCIAL

## 2019-07-01 PROCEDURE — 97110 THERAPEUTIC EXERCISES: CPT | Mod: GP | Performed by: PHYSICAL THERAPIST

## 2019-07-08 ENCOUNTER — HOSPITAL ENCOUNTER (OUTPATIENT)
Dept: PHYSICAL THERAPY | Facility: CLINIC | Age: 64
Setting detail: THERAPIES SERIES
End: 2019-07-08
Attending: ORTHOPAEDIC SURGERY
Payer: COMMERCIAL

## 2019-07-08 PROCEDURE — 97110 THERAPEUTIC EXERCISES: CPT | Mod: GP | Performed by: PHYSICAL THERAPIST

## 2019-07-08 NOTE — PROGRESS NOTES
Outpatient Physical Therapy Progress Note     Patient: Mery Sullivan  : 1955    Beginning/End Dates of Reporting Period:  19 to 2019    Referring Provider: Rosalio Lou MD    Therapy Diagnosis: R humeral ORIF     Client Self Report: Pt relates she is more ache in forearm from doing  more. Pt relates progress was slow this week. Writting and fine motor challenges are the most challenging. Pt relates she thinks she can still return to work in a couple weeks.     Objective Measurements:  Objective Measure: R Shoulder AROM  Details: flex: 135 abd:105  IR:R SI  ER: C7    Objective Measure: R shoulder MMT  Details: shoulder: flex: 3/5 Abd:3/5  ER:3/5 IR:4/5 elbow flex: 4/5 ext:4/5     Objective Measure: R Shoulder PROM  Details: flex: 160, IR:10  ER:45                        Outcome Measures (most recent score):  SPADI: 53%     Goals:  Goal Identifier Shoulder AROM   Goal Description Pt will demonstrate full  GH AROM with less than 1/10 pain in order to be able to don/doff jacket/shirt to return to PLOF w/o  pain.   Target Date 07/15/19   Date Met      Progress::improving however not met     Goal Identifier Shoulder AROM   Goal Description Pt will demonstrate 180 deg GH AROM flex w less than 1/10 pain to allow him to reach overhead  into cupboard to put dishes away without pain   Target Date 07/15/19   Date Met      Progress::improving however not met     Goal Identifier lifiting   Goal Description Pt will demonstrate 5/5 R UE strength to be able to complete all lifting/transfers at work   Target Date 19   Date Met      Progress:improving however not met     Goal Identifier SPADI   Goal Description Pt will report <10% disability on SPADI to demonstrate improved ability to complete daily activities and demonstrate significant clinical improvement   Target Date 19   Date Met      Progress:pt SPADI score went up however pt answered many as NA at IE       Progress Toward Goals:   Progress this  reporting period: Pt has been seen for 4 visits over this POC. In that time, pt has been making gains in strength and ROM however progress has been slow. Plan to continue 1x/week for 6 weeks.           Plan:  Continue therapy per current plan of care.    Discharge:  Tanna Harmon  Physical Therapist  [address] [address]  [email or web address]   [email or web address]   Office: [phone] Fax: [phone]

## 2019-07-11 ENCOUNTER — TRANSFERRED RECORDS (OUTPATIENT)
Dept: HEALTH INFORMATION MANAGEMENT | Facility: CLINIC | Age: 64
End: 2019-07-11

## 2019-07-15 ENCOUNTER — HOSPITAL ENCOUNTER (OUTPATIENT)
Dept: PHYSICAL THERAPY | Facility: CLINIC | Age: 64
Setting detail: THERAPIES SERIES
End: 2019-07-15
Attending: ORTHOPAEDIC SURGERY
Payer: COMMERCIAL

## 2019-07-15 PROCEDURE — 97110 THERAPEUTIC EXERCISES: CPT | Mod: GP | Performed by: PHYSICAL THERAPIST

## 2019-07-22 ENCOUNTER — HOSPITAL ENCOUNTER (OUTPATIENT)
Dept: PHYSICAL THERAPY | Facility: CLINIC | Age: 64
Setting detail: THERAPIES SERIES
End: 2019-07-22
Attending: ORTHOPAEDIC SURGERY
Payer: COMMERCIAL

## 2019-07-22 PROCEDURE — 97110 THERAPEUTIC EXERCISES: CPT | Mod: GP | Performed by: PHYSICAL THERAPIST

## 2019-07-29 ENCOUNTER — HOSPITAL ENCOUNTER (OUTPATIENT)
Dept: PHYSICAL THERAPY | Facility: CLINIC | Age: 64
Setting detail: THERAPIES SERIES
End: 2019-07-29
Attending: ORTHOPAEDIC SURGERY
Payer: COMMERCIAL

## 2019-07-29 PROCEDURE — 97110 THERAPEUTIC EXERCISES: CPT | Mod: GP | Performed by: PHYSICAL THERAPIST

## 2019-08-13 ENCOUNTER — HOSPITAL ENCOUNTER (OUTPATIENT)
Dept: PHYSICAL THERAPY | Facility: CLINIC | Age: 64
Setting detail: THERAPIES SERIES
End: 2019-08-13
Attending: ORTHOPAEDIC SURGERY
Payer: COMMERCIAL

## 2019-08-13 PROCEDURE — 97110 THERAPEUTIC EXERCISES: CPT | Mod: GP | Performed by: PHYSICAL THERAPIST

## 2019-08-13 NOTE — PROGRESS NOTES
Outpatient Physical Therapy Discharge Note     Patient: Mery Sullivan  : 1955    Beginning/End Dates of Reporting Period:  19 to 2019    Referring Provider: Rosalio Lou MD     Therapy Diagnosis: R humeral ORIF     Client Self Report: Pt relates she is doing pretty good. Pt relates acheness in shoulder after use but overall improving. Pt relates hand is still tingling, worse after activity. Pt will see MD on Thursday    Objective Measurements:  Objective Measure: R Shoulder AROM  Details: flex: 140 abd:140  IR:L3   ER: C7    Objective Measure: R shoulder MMT  Details: shoulder: flex: 3+/5 Abd:3+/5  ER:4/5 IR:5/5 elbow flex: 5/5 ext:5/5     Objective Measure: R Shoulder PROM  Details: NT         Outcome Measures (most recent score):  SPADI:23%      Goals:  Goal Identifier Shoulder AROM   Goal Description Pt will demonstrate full  GH AROM with less than 1/10 pain in order to be able to don/doff jacket/shirt to return to PLOF w/o  pain.   Target Date 07/15/19   Date Met      Progress:improved however not met     Goal Identifier Shoulder AROM   Goal Description Pt will demonstrate 180 deg GH AROM flex w less than 1/10 pain to allow him to reach overhead  into cupboard to put dishes away without pain   Target Date 07/15/19   Date Met      Progress::improved however not met     Goal Identifier lifiting   Goal Description Pt will demonstrate 5/5 R UE strength to be able to complete all lifting/transfers at work   Target Date 19   Date Met      Progress::improved however not met     Goal Identifier SPADI   Goal Description Pt will report <10% disability on SPADI to demonstrate improved ability to complete daily activities and demonstrate significant clinical improvement   Target Date 19   Date Met      Progress::improved however not met       Progress Toward Goals:   Progress this reporting period: Pt has been seen for 7 visits over this POC. In that time, pt has progressed w ROM and  strengthening. Numbness is present in hand however pt again relates it is improving. Pt no longer has sharp pain but rather relates acheness with overuse.  While deficits are noted pt is independent with HEP and able to continue to progress at home. Thus plan to discharge to [facility] at this time.           Plan:  Discharge from therapy.    Discharge:    Reason for Discharge: No further expectation of progress. Able to continue to progress with HEP at home    Equipment Issued: NA    Discharge Plan: Patient to continue home program.    Tuyet Harmon  Physical Therapist  [facility]  [address] [place] [id number]  [email or web address]   [email or web address]   Office: [phone] Fax: [phone]

## 2019-09-16 ENCOUNTER — OFFICE VISIT (OUTPATIENT)
Dept: FAMILY MEDICINE | Facility: CLINIC | Age: 64
End: 2019-09-16
Payer: COMMERCIAL

## 2019-09-16 VITALS
SYSTOLIC BLOOD PRESSURE: 128 MMHG | HEART RATE: 70 BPM | RESPIRATION RATE: 16 BRPM | HEIGHT: 64 IN | WEIGHT: 151.8 LBS | BODY MASS INDEX: 25.92 KG/M2 | DIASTOLIC BLOOD PRESSURE: 71 MMHG | TEMPERATURE: 98.3 F

## 2019-09-16 DIAGNOSIS — D64.9 ANEMIA, UNSPECIFIED TYPE: ICD-10-CM

## 2019-09-16 DIAGNOSIS — Z23 NEED FOR PROPHYLACTIC VACCINATION AND INOCULATION AGAINST INFLUENZA: ICD-10-CM

## 2019-09-16 DIAGNOSIS — H81.13 BENIGN PAROXYSMAL POSITIONAL VERTIGO DUE TO BILATERAL VESTIBULAR DISORDER: Primary | ICD-10-CM

## 2019-09-16 LAB
ANION GAP SERPL CALCULATED.3IONS-SCNC: 5 MMOL/L (ref 3–14)
BASOPHILS # BLD AUTO: 0 10E9/L (ref 0–0.2)
BASOPHILS NFR BLD AUTO: 0.5 %
BUN SERPL-MCNC: 17 MG/DL (ref 7–30)
CALCIUM SERPL-MCNC: 9.3 MG/DL (ref 8.5–10.1)
CHLORIDE SERPL-SCNC: 103 MMOL/L (ref 94–109)
CO2 SERPL-SCNC: 28 MMOL/L (ref 20–32)
CREAT SERPL-MCNC: 0.83 MG/DL (ref 0.52–1.04)
DIFFERENTIAL METHOD BLD: ABNORMAL
EOSINOPHIL # BLD AUTO: 0.1 10E9/L (ref 0–0.7)
EOSINOPHIL NFR BLD AUTO: 2.4 %
ERYTHROCYTE [DISTWIDTH] IN BLOOD BY AUTOMATED COUNT: 12.2 % (ref 10–15)
GFR SERPL CREATININE-BSD FRML MDRD: 76 ML/MIN/{1.73_M2}
GLUCOSE SERPL-MCNC: 89 MG/DL (ref 70–99)
HCT VFR BLD AUTO: 34.7 % (ref 35–47)
HGB BLD-MCNC: 10.8 G/DL (ref 11.7–15.7)
LYMPHOCYTES # BLD AUTO: 2.6 10E9/L (ref 0.8–5.3)
LYMPHOCYTES NFR BLD AUTO: 47.3 %
MCH RBC QN AUTO: 30.3 PG (ref 26.5–33)
MCHC RBC AUTO-ENTMCNC: 31.1 G/DL (ref 31.5–36.5)
MCV RBC AUTO: 97 FL (ref 78–100)
MONOCYTES # BLD AUTO: 0.5 10E9/L (ref 0–1.3)
MONOCYTES NFR BLD AUTO: 9.5 %
NEUTROPHILS # BLD AUTO: 2.2 10E9/L (ref 1.6–8.3)
NEUTROPHILS NFR BLD AUTO: 40.3 %
PLATELET # BLD AUTO: 274 10E9/L (ref 150–450)
POTASSIUM SERPL-SCNC: 3.5 MMOL/L (ref 3.4–5.3)
RBC # BLD AUTO: 3.57 10E12/L (ref 3.8–5.2)
SODIUM SERPL-SCNC: 136 MMOL/L (ref 133–144)
WBC # BLD AUTO: 5.5 10E9/L (ref 4–11)

## 2019-09-16 PROCEDURE — 90682 RIV4 VACC RECOMBINANT DNA IM: CPT | Performed by: NURSE PRACTITIONER

## 2019-09-16 PROCEDURE — 83540 ASSAY OF IRON: CPT | Performed by: NURSE PRACTITIONER

## 2019-09-16 PROCEDURE — 93000 ELECTROCARDIOGRAM COMPLETE: CPT | Performed by: NURSE PRACTITIONER

## 2019-09-16 PROCEDURE — 80048 BASIC METABOLIC PNL TOTAL CA: CPT | Performed by: NURSE PRACTITIONER

## 2019-09-16 PROCEDURE — 82728 ASSAY OF FERRITIN: CPT | Performed by: NURSE PRACTITIONER

## 2019-09-16 PROCEDURE — 83550 IRON BINDING TEST: CPT | Performed by: NURSE PRACTITIONER

## 2019-09-16 PROCEDURE — 99214 OFFICE O/P EST MOD 30 MIN: CPT | Mod: 25 | Performed by: NURSE PRACTITIONER

## 2019-09-16 PROCEDURE — 90471 IMMUNIZATION ADMIN: CPT | Performed by: NURSE PRACTITIONER

## 2019-09-16 PROCEDURE — 85025 COMPLETE CBC W/AUTO DIFF WBC: CPT | Performed by: NURSE PRACTITIONER

## 2019-09-16 PROCEDURE — 36415 COLL VENOUS BLD VENIPUNCTURE: CPT | Performed by: NURSE PRACTITIONER

## 2019-09-16 RX ORDER — MECLIZINE HYDROCHLORIDE 25 MG/1
25 TABLET ORAL 3 TIMES DAILY PRN
Qty: 30 TABLET | Refills: 1 | Status: SHIPPED | OUTPATIENT
Start: 2019-09-16 | End: 2020-02-05

## 2019-09-16 ASSESSMENT — ANXIETY QUESTIONNAIRES
GAD7 TOTAL SCORE: 0
4. TROUBLE RELAXING: NOT AT ALL
6. BECOMING EASILY ANNOYED OR IRRITABLE: NOT AT ALL
3. WORRYING TOO MUCH ABOUT DIFFERENT THINGS: NOT AT ALL
5. BEING SO RESTLESS THAT IT IS HARD TO SIT STILL: NOT AT ALL
7. FEELING AFRAID AS IF SOMETHING AWFUL MIGHT HAPPEN: NOT AT ALL
7. FEELING AFRAID AS IF SOMETHING AWFUL MIGHT HAPPEN: NOT AT ALL
GAD7 TOTAL SCORE: 0
2. NOT BEING ABLE TO STOP OR CONTROL WORRYING: NOT AT ALL
1. FEELING NERVOUS, ANXIOUS, OR ON EDGE: NOT AT ALL
GAD7 TOTAL SCORE: 0

## 2019-09-16 ASSESSMENT — PATIENT HEALTH QUESTIONNAIRE - PHQ9
SUM OF ALL RESPONSES TO PHQ QUESTIONS 1-9: 2
SUM OF ALL RESPONSES TO PHQ QUESTIONS 1-9: 2
10. IF YOU CHECKED OFF ANY PROBLEMS, HOW DIFFICULT HAVE THESE PROBLEMS MADE IT FOR YOU TO DO YOUR WORK, TAKE CARE OF THINGS AT HOME, OR GET ALONG WITH OTHER PEOPLE: NOT DIFFICULT AT ALL

## 2019-09-16 ASSESSMENT — MIFFLIN-ST. JEOR: SCORE: 1238.56

## 2019-09-16 NOTE — PROGRESS NOTES
Subjective     Mery Sullivan is a 63 year old female who presents to clinic today for the following health issues:    HPI   Dizziness  Onset: couple weeks    Description:   Do you feel faint:  YES comes on suddenly  Does it feel like the surroundings (bed, room) are moving: YES  Unsteady/off balance: YES  Have you passed out or fallen: no     Intensity: moderate    Progression of Symptoms:  Worsening     Accompanying Signs & Symptoms:  Heart palpitations: no   Nausea, vomiting: YES nausea but doesn't last long  Weakness in arms or legs: no   Fatigue: YES  Vision or speech changes: YES vison  Ringing in ears (Tinnitus): no   Hearing Loss: no     History:   Head trauma/concussion hx: no   Previous similar symptoms: no   Recent bleeding history: no     Precipitating factors:   Worse with activity or head movement: YES- Can't do anything while dizzy  Any new medications (BP?): YES, stopped the meds  Alcohol/drug abuse/withdrawal: no     Alleviating factors:   Does staying in a fixed position give relief:  YES, can't move at all when dizzy    Therapies Tried and outcome:     Patient Active Problem List   Diagnosis     Essential hypertension, benign     Dysthymic disorder     Generalized anxiety disorder     Hyperlipidemia     Mantoux: positive, treated     Near syncope     History reviewed. No pertinent surgical history.    Social History     Tobacco Use     Smoking status: Never Smoker     Smokeless tobacco: Never Used   Substance Use Topics     Alcohol use: Yes     Comment: occ     Family History   Problem Relation Age of Onset     Heart Disease Father         CHF     Hypertension Father      Cerebrovascular Disease Mother      Osteoporosis Mother      Cancer - colorectal Maternal Grandmother      Allergies Sister      Obesity Sister      Obesity Brother          Current Outpatient Medications   Medication Sig Dispense Refill     calcium carbonate 600 mg-vitamin D 400 units (CALTRATE) 600-400 MG-UNIT per tablet Take 1  tablet by mouth 2 times daily       FERROUS GLUCONATE 325 MG OR TABS 1 TABLET DAILY       FLUoxetine (PROZAC) 10 MG capsule Take 10 mg by mouth every morning       losartan (COZAAR) 50 MG tablet TAKE ONE TABLET BY MOUTH TWICE A  tablet 1     Multiple Vitamin (MULTI-VITAMINS) TABS Take 1 tablet by mouth daily       vitamin C (ASCORBIC ACID) 100 MG tablet Take 1,000 mg by mouth daily        amLODIPine (NORVASC) 2.5 MG tablet Take 1 tablet (2.5 mg) by mouth daily (Patient not taking: Reported on 9/16/2019) 90 tablet 1     ezetimibe (ZETIA) 10 MG tablet Take 1 tablet (10 mg) by mouth daily (Patient not taking: Reported on 9/16/2019) 90 tablet 3     hydrOXYzine (ATARAX) 25 MG tablet Take 1 tablet (25 mg) by mouth every 6 hours as needed for anxiety or other (insomina) (Patient not taking: Reported on 9/16/2019) 60 tablet 0     Allergies   Allergen Reactions     Atenolol      Cold hands and feet     Atorvastatin Other (See Comments)     Elevated liver enzymes      Dyazide [Hydrochlorothiazide W-Triamterene]      Caused cramps     Lisinopril      Worked for blood pressure but over time caused chest discomfort     Sulfa Drugs Rash     fever     Recent Labs   Lab Test 06/17/19  0829 05/01/19  1010 04/29/19  1341 02/27/19  0902 02/08/19  1053 02/04/19  0511 02/03/19  1717   A1C  --   --   --   --  5.1  --   --    LDL  --  120*  --   --   --   --   --    HDL  --  98  --   --   --   --   --    TRIG  --  57  --   --   --   --   --    ALT 23  --  157* 39 115*  --   --    CR  --   --   --   --  0.81 0.90 0.93   GFRESTIMATED  --   --   --   --  79 68 66   GFRESTBLACK  --   --   --   --  >90 79 77   POTASSIUM  --   --   --   --  3.9 3.8 3.5   TSH  --   --   --   --   --   --  1.91      BP Readings from Last 3 Encounters:   09/16/19 (!) 140/82   05/01/19 124/78   02/08/19 126/80    Wt Readings from Last 3 Encounters:   09/16/19 68.9 kg (151 lb 12.8 oz)   05/01/19 67.1 kg (148 lb)   02/08/19 69.9 kg (154 lb)                 "    Reviewed and updated as needed this visit by Provider         Review of Systems   ROS COMP: Constitutional, HEENT, cardiovascular, pulmonary, gi and gu systems are negative, except as otherwise noted.      Objective    BP (!) 140/82 (BP Location: Right arm, Patient Position: Sitting, Cuff Size: Adult Regular)   Pulse 70   Temp 98.3  F (36.8  C) (Tympanic)   Resp 16   Ht 1.626 m (5' 4\")   Wt 68.9 kg (151 lb 12.8 oz)   LMP 06/16/2006   BMI 26.06 kg/m    Body mass index is 26.06 kg/m .  Physical Exam   GENERAL: healthy, alert and no distress  EYES: Eyes grossly normal to inspection, PERRL and conjunctivae and sclerae normal  HENT: ear canals and TM's normal, nose and mouth without ulcers or lesions  NECK: no adenopathy, no asymmetry, masses, or scars and thyroid normal to palpation  RESP: lungs clear to auscultation - no rales, rhonchi or wheezes  CV: regular rate and rhythm, normal S1 S2, no S3 or S4, no murmur, click or rub, no peripheral edema and peripheral pulses strong  MS: no gross musculoskeletal defects noted, no edema  SKIN: no suspicious lesions or rashes  NEURO: Normal strength and tone, mentation intact and speech normal  PSYCH: mentation appears normal, affect normal/bright    Results for orders placed or performed in visit on 09/16/19   CBC with platelets differential   Result Value Ref Range    WBC 5.5 4.0 - 11.0 10e9/L    RBC Count 3.57 (L) 3.8 - 5.2 10e12/L    Hemoglobin 10.8 (L) 11.7 - 15.7 g/dL    Hematocrit 34.7 (L) 35.0 - 47.0 %    MCV 97 78 - 100 fl    MCH 30.3 26.5 - 33.0 pg    MCHC 31.1 (L) 31.5 - 36.5 g/dL    RDW 12.2 10.0 - 15.0 %    Platelet Count 274 150 - 450 10e9/L    % Neutrophils 40.3 %    % Lymphocytes 47.3 %    % Monocytes 9.5 %    % Eosinophils 2.4 %    % Basophils 0.5 %    Absolute Neutrophil 2.2 1.6 - 8.3 10e9/L    Absolute Lymphocytes 2.6 0.8 - 5.3 10e9/L    Absolute Monocytes 0.5 0.0 - 1.3 10e9/L    Absolute Eosinophils 0.1 0.0 - 0.7 10e9/L    Absolute Basophils 0.0 " "0.0 - 0.2 10e9/L    Diff Method Automated Method    **Basic metabolic panel FUTURE anytime   Result Value Ref Range    Sodium 136 133 - 144 mmol/L    Potassium 3.5 3.4 - 5.3 mmol/L    Chloride 103 94 - 109 mmol/L    Carbon Dioxide 28 20 - 32 mmol/L    Anion Gap 5 3 - 14 mmol/L    Glucose 89 70 - 99 mg/dL    Urea Nitrogen 17 7 - 30 mg/dL    Creatinine 0.83 0.52 - 1.04 mg/dL    GFR Estimate 76 >60 mL/min/[1.73_m2]    GFR Estimate If Black 88 >60 mL/min/[1.73_m2]    Calcium 9.3 8.5 - 10.1 mg/dL             Assessment & Plan     (H81.13) Benign paroxysmal positional vertigo due to bilateral vestibular disorder  (primary encounter diagnosis)  Comment: Patient noted to have mildly low hemoglobin patient will restart iron medications we will recheck iron hemoglobin in 2 to 3 weeks  Plan: CBC with platelets differential, **Basic         metabolic panel FUTURE anytime, PHYSICAL         THERAPY REFERRAL, EKG 12-lead complete w/read -        Clinics, meclizine (ANTIVERT) 25 MG tablet       (Z23) Need for prophylactic vaccination and inoculation against influenza  Comment:   Plan: INFLUENZA QUAD, RECOMBINANT, P-FREE (RIV4)         (FLUBLOCK) [14534], Vaccine Administration,         Initial [27893]               BMI:   Estimated body mass index is 26.06 kg/m  as calculated from the following:    Height as of this encounter: 1.626 m (5' 4\").    Weight as of this encounter: 68.9 kg (151 lb 12.8 oz).           See Patient Instructions    No follow-ups on file.    LATESHA Venegas [facility]    Answers for HPI/ROS submitted by the patient on 9/16/2019   If you checked off any problems, how difficult have these problems made it for you to do your work, take care of things at home, or get along with other people?: Not difficult at all  PHQ9 TOTAL SCORE: 2  MAITE 7 TOTAL SCORE: 0    "

## 2019-09-16 NOTE — LETTER
[facility]  [address] [place] [id number]  [phone]        November 22, 2019    Mery Sullivan  [address] [facility] [address]              Dear Mery Sullivan    This is to remind you that your anemia labs are due.    You may call our office at [phone] to schedule an appointment.    Please disregard this notice if you have already had your labs drawn or made an appointment.        Sincerely,        Evette Franco CNP/ roselia

## 2019-09-16 NOTE — PATIENT INSTRUCTIONS
Continue blood pressure medication  as directed       Patient Education     Benign Paroxysmal Positional Vertigo     Your health care provider may move your head in certain ways to treat your BPPV.     Benign paroxysmal positional vertigo (BPPV) is a problem with the inner ear. The inner ear contains the vestibular system. This system is what helps you keep your balance. BPPV causes a feeling of spinning. It is a common problem of the vestibular system.  Understanding the vestibular system  The vestibular system of the ear is made up of very tiny parts. They include the utricle, saccule, and semicircular canals. The utricle is a tiny organ that contains calcium crystals. In some people, the crystals can move into the semicircular canals. When this happens, the system no longer works as it should. This causes BPPV. Benign means it is not life threatening. Paroxysmal means it happens suddenly. Positional means that it happens when you move your head. Vertigo is a feeling of spinning.  What causes BPPV?  Causes include injury to your head or neck. Other problems with the vestibular system may cause BPPV. In many people, the cause of BPPV is not known.  Symptoms of BPPV  You many have repeated feelings of spinning (vertigo). The vertigo usually lasts less than 1 minute. Some movements, such as rolling over in bed, can bring on vertigo.  Diagnosing BPPV  Your primary healthcare provider may diagnose and treat your BPPV. Or you may see an ear, nose, and throat doctor (otolaryngologist). In some cases, you may see a nervous system doctor (neurologist).  The healthcare provider will ask about your symptoms and your medical history. He or she will examine you. You may have hearing and balance tests. As part of the exam, your healthcare provider may have you move your head and body in certain ways. If you have BPPV, the movements can bring on vertigo. Your provider will also look for abnormal movements of your eyes. You may  have other tests to check your vestibular or nervous systems.  Treatment for BPPV  Your healthcare provider may try to move the calcium crystals. This is done by having you move your head and neck in certain ways. This treatment is safe and often works well. You may also be told to do these movements at home. You may still have vertigo for a few weeks. Your healthcare provider will recheck your symptoms, usually in about a month. Special physical therapy may also be part of treatment. In rare cases, surgery may be needed for BPPV that does not go away.     When to call the healthcare provider  Call your healthcare provider right away if you have any of these:    Symptoms that do not go away with treatment    Symptoms that get worse    New symptoms   Date Last Reviewed: 5/1/2017 2000-2018 [employer]. [address] [place] [address]. All rights reserved. This information is not intended as a substitute for professional medical care. Always follow your healthcare professional's instructions.           Patient Education     Dizziness (Vertigo) and Balance Problems: Diagnostic Tests    An otolaryngologist (also called an ENT) is a doctor who specializes in disorders of the ear, nose, and throat. Your ENT can help find clues to the cause of your dizziness. He or she will examine you and go over your health history. Your ENT may also order certain tests to help diagnose your problem.  Hearing testing  In most cases, you will be referred for hearing testing. This is because the nerve that sends balance signals also sends hearing signals. A problem that affects balance can also affect hearing.  Other tests  Your doctor may recommend more than one kind of test. The following tests are painless, but may cause dizziness in some cases.    MRI creates images of the ear or head. A magnetic field and contrast medium are used to make the image.    Electronystagmography (ENG) records eye movement. Small  electrodes are put on the skin around your eyes. Then your ear is filled with warm or cold water.    Rotation tests show the relationship between the inner ear and your eyes. You may be asked to wear special goggles or sit in a computerized chair.    Posturography tests your standing balance under different conditions. You will stand on a platform that measures shifts in your body weight.     Electrocochleography (ECoG) measures the fluid pressure in the inner ear. An abnormal ECoG may mean you have Meniere's disease or other conditions.    Vestibular evoked myogenic potentials (VEMPs) may be used if your healthcare provider suspects a rare condition like superior semicircular canal dehiscence. Electrodes are placed on your neck, and you hear clicks in your ear.  Date Last Reviewed: 11/1/2016 2000-2018 [employer]. [address] [place] [address], [place] [address]. All rights reserved. This information is not intended as a substitute for professional medical care. Always follow your healthcare professional's instructions.           Patient Education     Dizziness or Fainting During Pregnancy  Feeling dizzy or faint is very common during pregnancy. It generally does not mean something is wrong. It is most common during the first trimester, but it can happen anytime during pregnancy. Dizziness and fainting (syncope) are often caused by a drop in blood pressure. This is from the hormones released during pregnancy that relax the body s blood vessels. Too little blood is then pumped up to the brain. When this happens, you lose consciousness (faint). Fainting is not dangerous to you or your baby unless you fall and hurt yourself.     If you must stand for long periods, compression stockings can help keep your blood moving.   Home care  To help prevent dizziness and fainting:    When you get up from sitting or lying down, do so slowly. Clench and release your leg muscles before and during standing.    Avoid  standing for too long. If you must stand for prolonged periods of time, wear compression stockings. These are special stockings that help keep blood flowing toward the heart. Most medical supply stores sell these stockings.    Avoid long breaks between meals. Keep snacks handy in case you get hungry.    Avoid hot showers or baths. Use warm water. Be careful to stand up from a bath slowly.    After the first trimester, avoid lying on your back.    Try to eat every few hours. Instead of eating 3 larger meals a day, try eating 6 smaller meals. Snack on healthy foods that contain some protein like nuts and peanut butter.    If you have been told you are anemic, eating iron-rich foods may be helpful.  If you feel dizzy:    Sit or lie down. If you sit, put your head between your knees. If you lie down, try to get your feet higher than your head.    Take deep breaths. Breathe out slowly.    Move toward fresh or circulating air.    Loosen tight clothing.    Do not eat or drink anything while you feel dizzy  Follow-up care  Follow up with your healthcare provider, or as advised. Arrange for prenatal care if you haven t already. Go to all your prenatal appointments. Get prenatal tests as instructed.  When to seek medical advice  Call your healthcare provider right away if you have any of the following symptoms. These may mean a more severe cause for dizziness or fainting:    Vaginal bleeding    Pain in your abdomen    Less movement of the baby than usual    Unusually pale skin    Dizziness or fainting with blurred vision, headaches, confusion, palpitations, or fast heartbeat    You have fainted and hurt yourself or fallen hard on your abdomen  Date Last Reviewed: 6/1/2016 2000-2018 [employer]. [address] [place] [address], [place] [address]. All rights reserved. This information is not intended as a substitute for professional medical care. Always follow your healthcare professional's instructions.

## 2019-09-16 NOTE — LETTER
My Depression Action Plan  Name: Mery Sullivan   Date of Birth 1955  Date: 9/16/2019    My doctor: Evette Franco   My clinic: [address] [id number] [phone]          GREEN    ZONE   Good Control    What it looks like:     Things are going generally well. You have normal up s and down s. You may even feel depressed from time to time, but bad moods usually last less than a day.   What you need to do:  1. Continue to care for yourself (see self care plan)  2. Check your depression survival kit and update it as needed  3. Follow your physician s recommendations including any medication.  4. Do not stop taking medication unless you consult with your physician first.           YELLOW         ZONE Getting Worse    What it looks like:     Depression is starting to interfere with your life.     It may be hard to get out of bed; you may be starting to isolate yourself from others.    Symptoms of depression are starting to last most all day and this has happened for several days.     You may have suicidal thoughts but they are not constant.   What you need to do:     1. Call your care team, your response to treatment will improve if you keep your care team informed of your progress. Yellow periods are signs an adjustment may need to be made.     2. Continue your self-care, even if you have to fake it!    3. Talk to someone in your support network    4. Open up your depression survival kit           RED    ZONE Medical Alert - Get Help    What it looks like:     Depression is seriously interfering with your life.     You may experience these or other symptoms: You can t get out of bed most days, can t work or engage in other necessary activities, you have trouble taking care of basic hygiene, or basic responsibilities, thoughts of suicide or death that will not go away, self-injurious behavior.     What you need to do:  1. Call your care team and  request a same-day appointment. If they are not available (weekends or after hours) call your local crisis line, emergency room or 911.            Depression Self Care Plan / Survival Kit    Self-Care for Depression  Here s the deal. Your body and mind are really not as separate as most people think.  What you do and think affects how you feel and how you feel influences what you do and think. This means if you do things that people who feel good do, it will help you feel better.  Sometimes this is all it takes.  There is also a place for medication and therapy depending on how severe your depression is, so be sure to consult with your medical provider and/ or Behavioral Health Consultant if your symptoms are worsening or not improving.     In order to better manage my stress, I will:    Exercise  Get some form of exercise, every day. This will help reduce pain and release endorphins, the  feel good  chemicals in your brain. This is almost as good as taking antidepressants!  This is not the same as joining a gym and then never going! (they count on that by the way ) It can be as simple as just going for a walk or doing some gardening, anything that will get you moving.      Hygiene   Maintain good hygiene (Get out of bed in the morning, Make your bed, Brush your teeth, Take a shower, and Get dressed like you were going to work, even if you are unemployed).  If your clothes don't fit try to get ones that do.    Diet  I will strive to eat foods that are good for me, drink plenty of water, and avoid excessive sugar, caffeine, alcohol, and other mood-altering substances.  Some foods that are helpful in depression are: complex carbohydrates, B vitamins, flaxseed, fish or fish oil, fresh fruits and vegetables.    Psychotherapy  I agree to participate in Individual Therapy (if recommended).    Medication  If prescribed medications, I agree to take them.  Missing doses can result in serious side effects.  I understand that  drinking alcohol, or other illicit drug use, may cause potential side effects.  I will not stop my medication abruptly without first discussing it with my provider.    Staying Connected With Others  I will stay in touch with my friends, family members, and my primary care provider/team.    Use your imagination  Be creative.  We all have a creative side; it doesn t matter if it s oil painting, sand castles, or mud pies! This will also kick up the endorphins.    Witness Beauty  (AKA stop and smell the roses) Take a look outside, even in mid-winter. Notice colors, textures. Watch the squirrels and birds.     Service to others  Be of service to others.  There is always someone else in need.  By helping others we can  get out of ourselves  and remember the really important things.  This also provides opportunities for practicing all the other parts of the program.    Humor  Laugh and be silly!  Adjust your TV habits for less news and crime-drama and more comedy.    Control your stress  Try breathing deep, massage therapy, biofeedback, and meditation. Find time to relax each day.     My support system    Clinic Contact:  Phone number:    Contact 1:  Phone number:    Contact 2:  Phone number:    [employer]/:  Phone number:    Therapist:  Phone number:    Local crisis center:    Phone number:    Other community support:  Phone number:

## 2019-09-17 LAB
FERRITIN SERPL-MCNC: 84 NG/ML (ref 8–252)
IRON SATN MFR SERPL: 40 % (ref 15–46)
IRON SERPL-MCNC: 119 UG/DL (ref 35–180)
TIBC SERPL-MCNC: 301 UG/DL (ref 240–430)

## 2019-09-17 ASSESSMENT — ANXIETY QUESTIONNAIRES: GAD7 TOTAL SCORE: 0

## 2019-09-26 ENCOUNTER — HOSPITAL ENCOUNTER (OUTPATIENT)
Dept: PHYSICAL THERAPY | Facility: CLINIC | Age: 64
Setting detail: THERAPIES SERIES
End: 2019-09-26
Attending: NURSE PRACTITIONER
Payer: COMMERCIAL

## 2019-09-26 PROCEDURE — 97161 PT EVAL LOW COMPLEX 20 MIN: CPT | Mod: GP

## 2019-09-26 PROCEDURE — 95992 CANALITH REPOSITIONING PROC: CPT | Mod: GP

## 2019-09-26 NOTE — PROGRESS NOTES
09/26/19 1300   Quick Adds   Quick Adds Vestibular Eval   General Information   Start of Care Date 09/26/19   Referring Physician Evette Franco, LATESHA CNP   Orders Evaluate and Treat as Indicated   Order Date 09/16/19   Medical Diagnosis Benign paroxysmal positional vertigo due to bilateral vestibular disorder    Onset of illness/injury or Date of Surgery 08/26/19   Surgical/Medical history reviewed Yes  (HTN, sulfa allergies, R humerus fx)   Pertinent history of current vestibular problem (include personal factors and/or comorbidities that impact the POC)  Anxiety;Hearing loss   Hearing Loss Comments mild in L ear   Pertinent history of current problem (include personal factors and/or comorbidities that impact the POC) Pt started on a different medication for cholesterol and became very dizzy for 30-60 sec. So she stopped the med and  it went away. Then it started up again 2 weeks later. It has not gone away. It lasts just a few seconds.   Pertinent Visual History  wears glasses   Prior level of function comment independent   Current Community Support Family/friend caregiver   Patient role/Employment history Employed   Living environment House/townhome   Home/Community Accessibility Comments stairs, drives   Patient/Family Goals Statement get rid of dizziness   Fall Risk Screen   Fall screen completed by PT   Have you fallen 2 or more times in the past year? No   Have you fallen and had an injury in the past year? Yes   Timed Up and Go score (seconds) 9   Is patient a fall risk? No   Fall screen comments pt fell due to very low blood pressure   Abuse Screen (yes response referral indicated)   Feels Unsafe at Home or Work/School no   Feels Threatened by Someone no   Does Anyone Try to Keep You From Having Contact with Others or Doing Things Outside Your Home? no   Physical Signs of Abuse Present no   System Outcome Measures   Outcome Measures BPPV   Dizziness Handicap Inventory (score out of 100) A decrease in  score by 17.18 or greater indicates a clinically significant change in symptoms. 28   Balance   Balance Comments Pt declined balance assessment   Oculomotor Exam   Smooth Pursuit Normal   Smooth Pursuit Comment Pt reports she feels like continuing this will make her more dizzy.   Saccades Normal   VOR Normal   Rapid Head Thrust Normal   Infrared Goggle Exam or Frenzel Lense Exam   Vestibular Suppressant in Last 24 Hours? No   Exam completed with Infrared Goggles   Spontaneous Nystagmus Negative   Gaze Evoked Nystagmus Negative   Head Shake Horizontal Nystagmus Negative   Paris-Hallpike (right) Negative   Paris-Hallpike (Left) Upbeating R torsional   [facility] Supine Roll Test (Right) Negative   [facility] Supine Roll Test (Left) Negative   BPPV Canal(s) L Posterior   BPPV Type Canalithasis   Planned Therapy Interventions   Planned Therapy Interventions   (CRT)   Clinical Impression   Criteria for Skilled Therapeutic Interventions Met yes, treatment indicated   PT Diagnosis L posterior canal BPPV   Influenced by the following impairments dizziness   Functional limitations due to impairments bending, rolling, looking up   Clinical Presentation Stable/Uncomplicated   Clinical Presentation Rationale clinical judgement   Clinical Decision Making (Complexity) Low complexity   Therapy Frequency 1 time/week   Predicted Duration of Therapy Intervention (days/wks) 4 weeks   Risk & Benefits of therapy have been explained Yes   Patient, Family & other staff in agreement with plan of care Yes   Education Assessment   Preferred Learning Style Listening;Demonstration;Pictures/video   Barriers to Learning No barriers   Total Evaluation Time   PT Eval, Low Complexity Minutes (54379) 20     Lena Agudelo PT

## 2019-11-02 ENCOUNTER — HEALTH MAINTENANCE LETTER (OUTPATIENT)
Age: 64
End: 2019-11-02

## 2019-11-12 DIAGNOSIS — I10 BENIGN ESSENTIAL HYPERTENSION: ICD-10-CM

## 2019-11-12 RX ORDER — AMLODIPINE BESYLATE 2.5 MG/1
TABLET ORAL
Qty: 90 TABLET | Refills: 3 | Status: SHIPPED | OUTPATIENT
Start: 2019-11-12 | End: 2020-02-05

## 2019-11-18 DIAGNOSIS — D64.9 ANEMIA, UNSPECIFIED TYPE: Primary | ICD-10-CM

## 2019-11-18 RX ORDER — FERROUS SULFATE 325(65) MG
325 TABLET, DELAYED RELEASE (ENTERIC COATED) ORAL DAILY
Qty: 90 TABLET | Refills: 1 | Status: SHIPPED | OUTPATIENT
Start: 2019-11-18 | End: 2020-02-05

## 2019-12-16 DIAGNOSIS — F41.9 ANXIETY: Primary | ICD-10-CM

## 2019-12-16 RX ORDER — FLUOXETINE 10 MG/1
10 CAPSULE ORAL EVERY MORNING
Qty: 90 CAPSULE | Refills: 1 | Status: SHIPPED | OUTPATIENT
Start: 2019-12-16 | End: 2020-02-05

## 2019-12-16 NOTE — TELEPHONE ENCOUNTER
"Requested Prescriptions   Pending Prescriptions Disp Refills     FLUoxetine (PROZAC) 10 MG capsule       Sig: Take 1 capsule (10 mg) by mouth every morning       SSRIs Protocol Passed - 12/16/2019 12:58 PM        Passed - Recent (12 mo) or future (30 days) visit within the authorizing provider's specialty     Patient has had an office visit with the authorizing provider or a provider within the authorizing providers department within the previous 12 mos or has a future within next 30 days. See \"Patient Info\" tab in inbasket, or \"Choose Columns\" in Meds & Orders section of the refill encounter.              Passed - Medication is active on med list        Passed - Patient is age 18 or older        Passed - No active pregnancy on record        Passed - No positive pregnancy test in last 12 months        Last Written Prescription Date:  8/20/18  Patient Reported medication  Last Fill Quantity:  ,  # refills:     Last office visit: 9/16/2019 with prescribing provider:  Evette Franco   Future Office Visit:      "

## 2019-12-23 ENCOUNTER — TRANSFERRED RECORDS (OUTPATIENT)
Dept: HEALTH INFORMATION MANAGEMENT | Facility: CLINIC | Age: 64
End: 2019-12-23

## 2020-01-02 ENCOUNTER — TELEPHONE (OUTPATIENT)
Dept: FAMILY MEDICINE | Facility: CLINIC | Age: 65
End: 2020-01-02

## 2020-01-02 NOTE — TELEPHONE ENCOUNTER
Patient was told to return for anemia labs, reminder letter was sent to patient on 11/22/2019, patient has still not scheduled an appointment.

## 2020-01-09 NOTE — PROGRESS NOTES
Pt cancelled her last appointment as she was feeling better and has not returned for PT. Let the Initial Evaluation serve as the Discharge Summary.

## 2020-01-21 ENCOUNTER — TELEPHONE (OUTPATIENT)
Dept: FAMILY MEDICINE | Facility: CLINIC | Age: 65
End: 2020-01-21

## 2020-01-22 DIAGNOSIS — D64.9 ANEMIA, UNSPECIFIED TYPE: ICD-10-CM

## 2020-01-22 LAB
BASOPHILS # BLD AUTO: 0 10E9/L (ref 0–0.2)
BASOPHILS NFR BLD AUTO: 0.7 %
DIFFERENTIAL METHOD BLD: ABNORMAL
EOSINOPHIL # BLD AUTO: 0.1 10E9/L (ref 0–0.7)
EOSINOPHIL NFR BLD AUTO: 1.8 %
ERYTHROCYTE [DISTWIDTH] IN BLOOD BY AUTOMATED COUNT: 11.3 % (ref 10–15)
FERRITIN SERPL-MCNC: 114 NG/ML (ref 8–252)
HCT VFR BLD AUTO: 35.2 % (ref 35–47)
HGB BLD-MCNC: 11.7 G/DL (ref 11.7–15.7)
IRON SATN MFR SERPL: 41 % (ref 15–46)
IRON SERPL-MCNC: 117 UG/DL (ref 35–180)
LYMPHOCYTES # BLD AUTO: 2 10E9/L (ref 0.8–5.3)
LYMPHOCYTES NFR BLD AUTO: 44.9 %
MCH RBC QN AUTO: 32.1 PG (ref 26.5–33)
MCHC RBC AUTO-ENTMCNC: 33.2 G/DL (ref 31.5–36.5)
MCV RBC AUTO: 96 FL (ref 78–100)
MONOCYTES # BLD AUTO: 0.5 10E9/L (ref 0–1.3)
MONOCYTES NFR BLD AUTO: 11.8 %
NEUTROPHILS # BLD AUTO: 1.8 10E9/L (ref 1.6–8.3)
NEUTROPHILS NFR BLD AUTO: 40.8 %
PLATELET # BLD AUTO: 270 10E9/L (ref 150–450)
RBC # BLD AUTO: 3.65 10E12/L (ref 3.8–5.2)
TIBC SERPL-MCNC: 282 UG/DL (ref 240–430)
WBC # BLD AUTO: 4.5 10E9/L (ref 4–11)

## 2020-01-22 PROCEDURE — 85025 COMPLETE CBC W/AUTO DIFF WBC: CPT | Performed by: NURSE PRACTITIONER

## 2020-01-22 PROCEDURE — 83550 IRON BINDING TEST: CPT | Performed by: NURSE PRACTITIONER

## 2020-01-22 PROCEDURE — 36415 COLL VENOUS BLD VENIPUNCTURE: CPT | Performed by: NURSE PRACTITIONER

## 2020-01-22 PROCEDURE — 82728 ASSAY OF FERRITIN: CPT | Performed by: NURSE PRACTITIONER

## 2020-01-22 PROCEDURE — 83540 ASSAY OF IRON: CPT | Performed by: NURSE PRACTITIONER

## 2020-02-05 ENCOUNTER — OFFICE VISIT (OUTPATIENT)
Dept: FAMILY MEDICINE | Facility: CLINIC | Age: 65
End: 2020-02-05
Payer: COMMERCIAL

## 2020-02-05 VITALS
TEMPERATURE: 96.9 F | RESPIRATION RATE: 14 BRPM | BODY MASS INDEX: 25.94 KG/M2 | HEART RATE: 69 BPM | DIASTOLIC BLOOD PRESSURE: 76 MMHG | SYSTOLIC BLOOD PRESSURE: 130 MMHG | WEIGHT: 146.4 LBS | HEIGHT: 63 IN | OXYGEN SATURATION: 99 %

## 2020-02-05 DIAGNOSIS — Z00.00 ROUTINE GENERAL MEDICAL EXAMINATION AT A HEALTH CARE FACILITY: Primary | ICD-10-CM

## 2020-02-05 DIAGNOSIS — F41.9 ANXIETY: ICD-10-CM

## 2020-02-05 DIAGNOSIS — H90.12 CONDUCTIVE HEARING LOSS OF LEFT EAR, UNSPECIFIED HEARING STATUS ON CONTRALATERAL SIDE: ICD-10-CM

## 2020-02-05 DIAGNOSIS — I10 BENIGN ESSENTIAL HYPERTENSION: ICD-10-CM

## 2020-02-05 DIAGNOSIS — D64.9 ANEMIA, UNSPECIFIED TYPE: ICD-10-CM

## 2020-02-05 LAB
ALBUMIN SERPL-MCNC: 4.1 G/DL (ref 3.4–5)
ALP SERPL-CCNC: 64 U/L (ref 40–150)
ALT SERPL W P-5'-P-CCNC: 23 U/L (ref 0–50)
ANION GAP SERPL CALCULATED.3IONS-SCNC: 4 MMOL/L (ref 3–14)
AST SERPL W P-5'-P-CCNC: 17 U/L (ref 0–45)
BILIRUB SERPL-MCNC: 0.5 MG/DL (ref 0.2–1.3)
BUN SERPL-MCNC: 19 MG/DL (ref 7–30)
CALCIUM SERPL-MCNC: 9.5 MG/DL (ref 8.5–10.1)
CHLORIDE SERPL-SCNC: 101 MMOL/L (ref 94–109)
CHOLEST SERPL-MCNC: 308 MG/DL
CO2 SERPL-SCNC: 30 MMOL/L (ref 20–32)
CREAT SERPL-MCNC: 0.78 MG/DL (ref 0.52–1.04)
GFR SERPL CREATININE-BSD FRML MDRD: 81 ML/MIN/{1.73_M2}
GLUCOSE SERPL-MCNC: 89 MG/DL (ref 70–99)
HDLC SERPL-MCNC: 86 MG/DL
LDLC SERPL CALC-MCNC: 204 MG/DL
NONHDLC SERPL-MCNC: 222 MG/DL
POTASSIUM SERPL-SCNC: 3.8 MMOL/L (ref 3.4–5.3)
PROT SERPL-MCNC: 7.3 G/DL (ref 6.8–8.8)
SODIUM SERPL-SCNC: 135 MMOL/L (ref 133–144)
TRIGL SERPL-MCNC: 90 MG/DL

## 2020-02-05 PROCEDURE — 99396 PREV VISIT EST AGE 40-64: CPT | Performed by: NURSE PRACTITIONER

## 2020-02-05 PROCEDURE — 36415 COLL VENOUS BLD VENIPUNCTURE: CPT | Performed by: NURSE PRACTITIONER

## 2020-02-05 PROCEDURE — 87624 HPV HI-RISK TYP POOLED RSLT: CPT | Performed by: NURSE PRACTITIONER

## 2020-02-05 PROCEDURE — 80061 LIPID PANEL: CPT | Performed by: NURSE PRACTITIONER

## 2020-02-05 PROCEDURE — 99213 OFFICE O/P EST LOW 20 MIN: CPT | Mod: 25 | Performed by: NURSE PRACTITIONER

## 2020-02-05 PROCEDURE — G0145 SCR C/V CYTO,THINLAYER,RESCR: HCPCS | Performed by: NURSE PRACTITIONER

## 2020-02-05 PROCEDURE — 80053 COMPREHEN METABOLIC PANEL: CPT | Performed by: NURSE PRACTITIONER

## 2020-02-05 RX ORDER — FLUOXETINE 10 MG/1
10 CAPSULE ORAL EVERY MORNING
Qty: 90 CAPSULE | Refills: 3 | Status: SHIPPED | OUTPATIENT
Start: 2020-02-05 | End: 2021-03-23

## 2020-02-05 RX ORDER — FERROUS SULFATE 325(65) MG
325 TABLET, DELAYED RELEASE (ENTERIC COATED) ORAL 2 TIMES DAILY
Qty: 180 TABLET | Refills: 3 | Status: SHIPPED | OUTPATIENT
Start: 2020-02-05 | End: 2021-03-29

## 2020-02-05 RX ORDER — AMLODIPINE BESYLATE 2.5 MG/1
2.5 TABLET ORAL DAILY
Qty: 90 TABLET | Refills: 3 | Status: SHIPPED | OUTPATIENT
Start: 2020-02-05 | End: 2021-02-10

## 2020-02-05 RX ORDER — LOSARTAN POTASSIUM 50 MG/1
50 TABLET ORAL 2 TIMES DAILY
Qty: 180 TABLET | Refills: 3 | Status: SHIPPED | OUTPATIENT
Start: 2020-02-05 | End: 2021-03-29

## 2020-02-05 ASSESSMENT — ENCOUNTER SYMPTOMS
DYSURIA: 0
HEADACHES: 0
DIARRHEA: 0
NAUSEA: 0
MYALGIAS: 0
NERVOUS/ANXIOUS: 0
ARTHRALGIAS: 0
DIZZINESS: 0
HEMATOCHEZIA: 0
FREQUENCY: 0
CONSTIPATION: 0
JOINT SWELLING: 0
FEVER: 0
BREAST MASS: 0
SHORTNESS OF BREATH: 0
ABDOMINAL PAIN: 0
COUGH: 0
SORE THROAT: 0
EYE PAIN: 0
HEMATURIA: 0
PALPITATIONS: 0
CHILLS: 0
WEAKNESS: 0
PARESTHESIAS: 0
HEARTBURN: 0

## 2020-02-05 ASSESSMENT — MIFFLIN-ST. JEOR: SCORE: 1189.32

## 2020-02-05 NOTE — PROGRESS NOTES
SUBJECTIVE:   CC: Mery Sullivan is an 64 year old woman who presents for preventive health visit.     Healthy Habits:     Getting at least 3 servings of Calcium per day:  Yes    Bi-annual eye exam:  Yes    Dental care twice a year:  Yes    Sleep apnea or symptoms of sleep apnea:  None    Diet:  Regular (no restrictions)    Frequency of exercise:  1 day/week    Duration of exercise:  15-30 minutes    Taking medications regularly:  Yes    Medication side effects:  Not applicable    PHQ-2 Total Score: 0    Additional concerns today:  No        Today's PHQ-2 Score:   PHQ-2 ( 1999 [employer]) 2/5/2020   Q1: Little interest or pleasure in doing things 0   Q2: Feeling down, depressed or hopeless 0   PHQ-2 Score 0   Q1: Little interest or pleasure in doing things Not at all   Q2: Feeling down, depressed or hopeless Not at all   PHQ-2 Score 0       Abuse: Current or Past(Physical, Sexual or Emotional)- No  Do you feel safe in your environment? Yes        Social History     Tobacco Use     Smoking status: Never Smoker     Smokeless tobacco: Never Used   Substance Use Topics     Alcohol use: Yes     Comment: occ     If you drink alcohol do you typically have >3 drinks per day or >7 drinks per week? No    Alcohol Use 2/5/2020   Prescreen: >3 drinks/day or >7 drinks/week? No       Reviewed orders with patient.  Reviewed health maintenance and updated orders accordingly - Yes  Labs reviewed in EPIC  BP Readings from Last 3 Encounters:   02/05/20 130/76   09/16/19 128/71   05/01/19 124/78    Wt Readings from Last 3 Encounters:   02/05/20 66.4 kg (146 lb 6.4 oz)   09/16/19 68.9 kg (151 lb 12.8 oz)   05/01/19 67.1 kg (148 lb)                  Patient Active Problem List   Diagnosis     Essential hypertension, benign     Dysthymic disorder     Generalized anxiety disorder     Hyperlipidemia     Mantoux: positive, treated     Near syncope     No past surgical history on file.    Social History     Tobacco Use     Smoking status: Never  Smoker     Smokeless tobacco: Never Used   Substance Use Topics     Alcohol use: Yes     Comment: occ     Family History   Problem Relation Age of Onset     Heart Disease Father         CHF     Hypertension Father      Cerebrovascular Disease Mother      Osteoporosis Mother      Cancer - colorectal Maternal Grandmother      Allergies Sister      Obesity Sister      Obesity Brother          Current Outpatient Medications   Medication Sig Dispense Refill     amLODIPine (NORVASC) 2.5 MG tablet Take 1 tablet (2.5 mg) by mouth daily 90 tablet 3     calcium carbonate 600 mg-vitamin D 400 units (CALTRATE) 600-400 MG-UNIT per tablet Take 1 tablet by mouth 2 times daily       ferrous sulfate (FE TABS) 325 (65 Fe) MG EC tablet Take 1 tablet (325 mg) by mouth 2 times daily 180 tablet 3     FLUoxetine (PROZAC) 10 MG capsule Take 1 capsule (10 mg) by mouth every morning 90 capsule 3     losartan (COZAAR) 50 MG tablet Take 1 tablet (50 mg) by mouth 2 times daily 180 tablet 3     Multiple Vitamin (MULTI-VITAMINS) TABS Take 1 tablet by mouth daily       vitamin C (ASCORBIC ACID) 100 MG tablet Take 1,000 mg by mouth daily        ezetimibe (ZETIA) 10 MG tablet Take 1 tablet (10 mg) by mouth daily (Patient not taking: Reported on 9/16/2019) 90 tablet 3     Allergies   Allergen Reactions     Atenolol      Cold hands and feet     Atorvastatin Other (See Comments)     Elevated liver enzymes      Dyazide [Hydrochlorothiazide W-Triamterene]      Caused cramps     Lisinopril      Worked for blood pressure but over time caused chest discomfort     Sulfa Drugs Rash     fever     Recent Labs   Lab Test 09/16/19  1449 06/17/19  0829 05/01/19  1010 04/29/19  1341 02/27/19  0902 02/08/19  1053  02/03/19  1717   A1C  --   --   --   --   --  5.1  --   --    LDL  --   --  120*  --   --   --   --   --    HDL  --   --  98  --   --   --   --   --    TRIG  --   --  57  --   --   --   --   --    ALT  --  23  --  157* 39 115*  --   --    CR 0.83  --   --    --   --  0.81   < > 0.93   GFRESTIMATED 76  --   --   --   --  79   < > 66   GFRESTBLACK 88  --   --   --   --  >90   < > 77   POTASSIUM 3.5  --   --   --   --  3.9   < > 3.5   TSH  --   --   --   --   --   --   --  1.91    < > = values in this interval not displayed.        Mammogram Screening: Patient under age 50, mutual decision reflected in health maintenance.      Pertinent mammograms are reviewed under the imaging tab.  History of abnormal Pap smear: NO - age 30-65 PAP every 5 years with negative HPV co-testing recommended  PAP / HPV 7/7/2006   PAP ASC-US(A)     Reviewed and updated as needed this visit by clinical staff  Allergies  Meds         Reviewed and updated as needed this visit by Provider        Past Medical History:   Diagnosis Date     Anemia, unspecified      Unspecified essential hypertension       No past surgical history on file.  OB History   No obstetric history on file.       Review of Systems   Constitutional: Negative for chills and fever.   HENT: Positive for hearing loss. Negative for congestion, ear pain and sore throat.    Eyes: Negative for pain and visual disturbance.   Respiratory: Negative for cough and shortness of breath.    Cardiovascular: Negative for chest pain, palpitations and peripheral edema.   Gastrointestinal: Negative for abdominal pain, constipation, diarrhea, heartburn, hematochezia and nausea.   Breasts:  Negative for tenderness, breast mass and discharge.   Genitourinary: Negative for dysuria, frequency, genital sores, hematuria, pelvic pain, urgency, vaginal bleeding and vaginal discharge.   Musculoskeletal: Negative for arthralgias, joint swelling and myalgias.   Skin: Negative for rash.   Neurological: Negative for dizziness, weakness, headaches and paresthesias.   Psychiatric/Behavioral: Negative for mood changes. The patient is not nervous/anxious.           OBJECTIVE:   LMP 06/16/2006    /76 (BP Location: Left arm, Patient Position: Sitting, Cuff  "Size: Adult Regular)   Pulse 69   Temp 96.9  F (36.1  C) (Tympanic)   Resp 14   Ht 1.594 m (5' 2.76\")   Wt 66.4 kg (146 lb 6.4 oz)   LMP 06/16/2006   SpO2 99%   BMI 26.14 kg/m      Physical Exam  GENERAL: healthy, alert and no distress  EYES: Eyes grossly normal to inspection, PERRL and conjunctivae and sclerae normal  HENT: ear canals and TM's normal, nose and mouth without ulcers or lesions  NECK: no adenopathy, no asymmetry, masses, or scars and thyroid normal to palpation  RESP: lungs clear to auscultation - no rales, rhonchi or wheezes  BREAST: normal without masses, tenderness or nipple discharge and no palpable axillary masses or adenopathy  CV: regular rate and rhythm, normal S1 S2, no S3 or S4, no murmur, click or rub, no peripheral edema and peripheral pulses strong  ABDOMEN: soft, nontender, no hepatosplenomegaly, no masses and bowel sounds normal   (female): normal female external genitalia, normal urethral meatus, vaginal mucosa pink, moist, well rugated, and normal cervix/adnexa/uterus without masses or discharge  MS: no gross musculoskeletal defects noted, no edema  SKIN: no suspicious lesions or rashes  NEURO: Normal strength and tone, mentation intact and speech normal  PSYCH: mentation appears normal, affect normal/bright    Diagnostic Test Results:  Results for orders placed or performed in visit on 02/05/20   HPV High Risk Types DNA Cervical     Status: None   Result Value Ref Range    HPV Source SurePath     HPV 16 DNA Negative NEG^Negative    HPV 18 DNA Negative NEG^Negative    Other HR HPV Negative NEG^Negative    Final Diagnosis This patient's sample is negative for HPV DNA.     Specimen Description Cervical Cells    Pap imaged thin layer screen with HPV - recommended age 30 - 65 years (select HPV order below)     Status: None   Result Value Ref Range    PAP NIL     Copath Report         Patient Name: TAM YAO  MR#: [id number]  Specimen #: [id number]  Collected: " 2/5/2020  Received: 2/6/2020  Reported: 2/7/2020 09:50  Ordering Phy(s): JESSICA ANTONIO    For improved result formatting, select 'View Enhanced Report Format' under   Linked Documents section.    SPECIMEN/STAIN PROCESS:  Pap imaged thin layer prep screening (Surepath, FocalPoint with guided   screening)       Pap-Cyto x 1, HPV ordered x 1    SOURCE: Cervical, endocervical  ----------------------------------------------------------------   Pap imaged thin layer prep screening (Surepath, FocalPoint with guided   screening)  SPECIMEN ADEQUACY:  Satisfactory for evaluation.  -Transitional zone component could not be determined due to atrophy.    CYTOLOGIC INTERPRETATION:    Negative for intraepithelial lesion or malignancy    Electronically signed out by:  TAHIR Moreau (ASCP)    CLINICAL HISTORY:  LMP: 6/16/06  Previous ASC-US  Date of Last Pap: 7/7/06,    Papanicolaou Test Limitations:  Cervical cytology  is a screening test with   limited sensitivity; regular  screening is critical for cancer prevention; Pap tests are primarily   effective for the diagnosis/prevention of  squamous cell carcinoma, not adenocarcinomas or other cancers.    COLLECTION SITE:  Client:  [facility]  Location: [facility])    The technical component of this testing was completed at the [facility], with the professional component performed   at the [facility], [address] [place] [id number] ([phone])       Comprehensive metabolic panel     Status: None   Result Value Ref Range    Sodium 135 133 - 144 mmol/L    Potassium 3.8 3.4 - 5.3 mmol/L    Chloride 101 94 - 109 mmol/L    Carbon Dioxide 30 20 - 32 mmol/L    Anion Gap 4 3 - 14 mmol/L    Glucose 89 70 - 99 mg/dL    Urea Nitrogen 19 7 - 30 mg/dL    Creatinine 0.78 0.52 - 1.04 mg/dL    GFR Estimate 81 >60 mL/min/[1.73_m2]    GFR Estimate If  Black >90 >60 mL/min/[1.73_m2]    Calcium 9.5 8.5 - 10.1 mg/dL    Bilirubin Total 0.5 0.2 - 1.3 mg/dL    Albumin 4.1 3.4 - 5.0 g/dL    Protein Total 7.3 6.8 - 8.8 g/dL    Alkaline Phosphatase 64 40 - 150 U/L    ALT 23 0 - 50 U/L    AST 17 0 - 45 U/L   Lipid panel reflex to direct LDL Fasting     Status: Abnormal   Result Value Ref Range    Cholesterol 308 (H) <200 mg/dL    Triglycerides 90 <150 mg/dL    HDL Cholesterol 86 >49 mg/dL    LDL Cholesterol Calculated 204 (H) <100 mg/dL    Non HDL Cholesterol 222 (H) <130 mg/dL         ASSESSMENT/PLAN:   (Z00.00) Routine general medical examination at a health care facility  (primary encounter diagnosis)  Comment: Completed today  Plan: HPV High Risk Types DNA Cervical, Pap imaged         thin layer screen with HPV - recommended age 30        - 65 years (select HPV order below),         Comprehensive metabolic panel, Lipid panel         reflex to direct LDL Fasting      (D64.9) Anemia, unspecified type  Comment: Controlled will have patient continue with iron supplements  Plan: ferrous sulfate (FE TABS) 325 (65 Fe) MG EC         tablet, OFFICE/OUTPT VISIT,EST,LEVL III      (F41.9) Anxiety  Comment: Controlled will continue with Prozac  Plan: FLUoxetine (PROZAC) 10 MG capsule, OFFICE/OUTPT        VISIT,EST,LEVL III      (I10) Benign essential hypertension  Comment: Controlled with current dose of blood pressure medication  Plan: losartan (COZAAR) 50 MG tablet, amLODIPine         (NORVASC) 2.5 MG tablet    (H90.12) Conductive hearing loss of left ear, unspecified hearing status on contralateral side  Comment: Patient has noticed increased hearing loss would like a formal exam referred to audiology for formal hearing test and follow-up with ENT  Plan: AUDIOLOGY ADULT REFERRAL, OTOLARYNGOLOGY         REFERRAL, OFFICE/OUTPT VISIT,EST,LEVL III        COUNSELING:  Reviewed preventive health counseling, as reflected in patient instructions       Regular exercise       Healthy  "diet/nutrition       Vision screening       Hearing screening       Folic Acid Counseling       Osteoporosis Prevention/Bone Health       Colon cancer screening       Consider Hep C screening for patients born between 1945 and 1965       HIV screeninx in teen years, 1x in adult years, and at intervals if high risk       (Mirtha)menopause management    Estimated body mass index is 26.06 kg/m  as calculated from the following:    Height as of 19: 1.626 m (5' 4\").    Weight as of 19: 68.9 kg (151 lb 12.8 oz).         reports that she has never smoked. She has never used smokeless tobacco.      Counseling Resources:  ATP IV Guidelines  Pooled Cohorts Equation Calculator  Breast Cancer Risk Calculator  FRAX Risk Assessment  ICSI Preventive Guidelines  Dietary Guidelines for Americans, 2010  USDA's MyPlate  ASA Prophylaxis  Lung CA Screening    LATESHA Venegas [facility]  "

## 2020-02-05 NOTE — LETTER
February 7, 2020      Mery Sullivan  [address] [address] [facility] [address]        Dear ,    We are writing to inform you of your test results.    Your lipid profile does show elevated cholesterol at 300 relates that below 200 however your HDL is at 81.  Based on this your your 10-year cardiac risk factor is 5.8 they recommend starting medications was anybody with a greater than 6 to 7% so we are right on the border.  I do know that the statins at times have made your liver enzymes elevated.  Let see if we can manage with just low-cholesterol diet and exercise and recheck in 1 year.     The 10-year ASCVD risk score (Albert TRAMMELL Jr., et al., 2013) is: 5.8%     Values used to calculate the score:       Age: 64 years       Sex: Female       Is Non- : No       Diabetic: No       Tobacco smoker: No       Systolic Blood Pressure: 130 mmHg       Is BP treated: Yes       HDL Cholesterol: 86 mg/dL       Total Cholesterol: 308 mg/dL     Resulted Orders   Comprehensive metabolic panel   Result Value Ref Range    Sodium 135 133 - 144 mmol/L    Potassium 3.8 3.4 - 5.3 mmol/L    Chloride 101 94 - 109 mmol/L    Carbon Dioxide 30 20 - 32 mmol/L    Anion Gap 4 3 - 14 mmol/L    Glucose 89 70 - 99 mg/dL      Comment:      Fasting specimen    Urea Nitrogen 19 7 - 30 mg/dL    Creatinine 0.78 0.52 - 1.04 mg/dL    GFR Estimate 81 >60 mL/min/[1.73_m2]      Comment:      Non  GFR Calc  Starting 12/18/2018, serum creatinine based estimated GFR (eGFR) will be   calculated using the Chronic Kidney Disease Epidemiology Collaboration   (CKD-EPI) equation.      GFR Estimate If Black >90 >60 mL/min/[1.73_m2]      Comment:       GFR Calc  Starting 12/18/2018, serum creatinine based estimated GFR (eGFR) will be   calculated using the Chronic Kidney Disease Epidemiology Collaboration   (CKD-EPI) equation.      Calcium 9.5 8.5 - 10.1 mg/dL    Bilirubin Total 0.5 0.2 - 1.3 mg/dL     Albumin 4.1 3.4 - 5.0 g/dL    Protein Total 7.3 6.8 - 8.8 g/dL    Alkaline Phosphatase 64 40 - 150 U/L    ALT 23 0 - 50 U/L    AST 17 0 - 45 U/L   Lipid panel reflex to direct LDL Fasting   Result Value Ref Range    Cholesterol 308 (H) <200 mg/dL      Comment:      Desirable:       <200 mg/dl    Triglycerides 90 <150 mg/dL      Comment:      Fasting specimen    HDL Cholesterol 86 >49 mg/dL    LDL Cholesterol Calculated 204 (H) <100 mg/dL      Comment:      Above desirable:  100-129 mg/dl  Borderline High:  130-159 mg/dL  High:             160-189 mg/dL  Very high:       >189 mg/dl      Non HDL Cholesterol 222 (H) <130 mg/dL      Comment:      Above Desirable:  130-159 mg/dl  Borderline high:  160-189 mg/dl  High:             190-219 mg/dl  Very high:       >219 mg/dl       If you have any questions or concerns, please call the clinic at the number listed above.     Sincerely,      Evette Franco, LATESHA CNP / jg

## 2020-02-07 LAB
COPATH REPORT: NORMAL
PAP: NORMAL

## 2020-02-07 NOTE — RESULT ENCOUNTER NOTE
Please Notify Mery  of test results your comprehensive panel is within normal limits.    Your lipid profile does show elevated cholesterol at 300 relates that below 200 however your HDL is at 81.  Based on this your your 10-year cardiac risk factor is 5.8 they recommend starting medications was anybody with a greater than 6 to 7% so we are right on the border.  I do know that the statins at times have made your liver enzymes elevated.  Let see if we can manage with just low-cholesterol diet and exercise and recheck in 1 year.    Evette Franco CNP      The 10-year ASCVD risk score (Albert TRAMMELL Jr., et al., 2013) is: 5.8%    Values used to calculate the score:      Age: 64 years      Sex: Female      Is Non- : No      Diabetic: No      Tobacco smoker: No      Systolic Blood Pressure: 130 mmHg      Is BP treated: Yes      HDL Cholesterol: 86 mg/dL      Total Cholesterol: 308 mg/dL      Evette Franco CNP

## 2020-02-11 LAB
FINAL DIAGNOSIS: NORMAL
HPV HR 12 DNA CVX QL NAA+PROBE: NEGATIVE
HPV16 DNA SPEC QL NAA+PROBE: NEGATIVE
HPV18 DNA SPEC QL NAA+PROBE: NEGATIVE
SPECIMEN DESCRIPTION: NORMAL
SPECIMEN SOURCE CVX/VAG CYTO: NORMAL

## 2020-05-19 ENCOUNTER — TELEPHONE (OUTPATIENT)
Dept: FAMILY MEDICINE | Facility: CLINIC | Age: 65
End: 2020-05-19

## 2020-11-16 ENCOUNTER — HEALTH MAINTENANCE LETTER (OUTPATIENT)
Age: 65
End: 2020-11-16

## 2021-02-08 DIAGNOSIS — I10 BENIGN ESSENTIAL HYPERTENSION: ICD-10-CM

## 2021-02-10 RX ORDER — AMLODIPINE BESYLATE 2.5 MG/1
TABLET ORAL
Qty: 90 TABLET | Refills: 0 | Status: SHIPPED | OUTPATIENT
Start: 2021-02-10 | End: 2021-03-29

## 2021-03-05 ENCOUNTER — IMMUNIZATION (OUTPATIENT)
Dept: NURSING | Facility: CLINIC | Age: 66
End: 2021-03-05
Payer: COMMERCIAL

## 2021-03-05 PROCEDURE — 0001A PR COVID VAC PFIZER DIL RECON 30 MCG/0.3 ML IM: CPT

## 2021-03-05 PROCEDURE — 91300 PR COVID VAC PFIZER DIL RECON 30 MCG/0.3 ML IM: CPT

## 2021-03-22 DIAGNOSIS — F41.9 ANXIETY: ICD-10-CM

## 2021-03-23 RX ORDER — FLUOXETINE 10 MG/1
CAPSULE ORAL
Qty: 90 CAPSULE | Refills: 0 | Status: SHIPPED | OUTPATIENT
Start: 2021-03-23 | End: 2021-03-29

## 2021-03-26 ENCOUNTER — IMMUNIZATION (OUTPATIENT)
Dept: NURSING | Facility: CLINIC | Age: 66
End: 2021-03-26
Attending: FAMILY MEDICINE
Payer: COMMERCIAL

## 2021-03-26 PROCEDURE — 0002A PR COVID VAC PFIZER DIL RECON 30 MCG/0.3 ML IM: CPT

## 2021-03-26 PROCEDURE — 91300 PR COVID VAC PFIZER DIL RECON 30 MCG/0.3 ML IM: CPT

## 2021-03-29 ENCOUNTER — TELEPHONE (OUTPATIENT)
Dept: FAMILY MEDICINE | Facility: CLINIC | Age: 66
End: 2021-03-29

## 2021-03-29 ENCOUNTER — NURSE TRIAGE (OUTPATIENT)
Dept: NURSING | Facility: CLINIC | Age: 66
End: 2021-03-29

## 2021-03-29 ENCOUNTER — OFFICE VISIT (OUTPATIENT)
Dept: FAMILY MEDICINE | Facility: CLINIC | Age: 66
End: 2021-03-29
Payer: COMMERCIAL

## 2021-03-29 ENCOUNTER — ANCILLARY PROCEDURE (OUTPATIENT)
Dept: GENERAL RADIOLOGY | Facility: CLINIC | Age: 66
End: 2021-03-29
Attending: PHYSICIAN ASSISTANT
Payer: COMMERCIAL

## 2021-03-29 VITALS
DIASTOLIC BLOOD PRESSURE: 80 MMHG | OXYGEN SATURATION: 97 % | HEIGHT: 63 IN | RESPIRATION RATE: 12 BRPM | BODY MASS INDEX: 26.01 KG/M2 | HEART RATE: 67 BPM | WEIGHT: 146.8 LBS | SYSTOLIC BLOOD PRESSURE: 130 MMHG

## 2021-03-29 DIAGNOSIS — F41.9 ANXIETY: ICD-10-CM

## 2021-03-29 DIAGNOSIS — H91.92 HEARING DIFFICULTY OF LEFT EAR: ICD-10-CM

## 2021-03-29 DIAGNOSIS — I10 BENIGN ESSENTIAL HYPERTENSION: ICD-10-CM

## 2021-03-29 DIAGNOSIS — Z86.2 HISTORY OF IRON DEFICIENCY ANEMIA: ICD-10-CM

## 2021-03-29 DIAGNOSIS — M25.552 HIP PAIN, LEFT: ICD-10-CM

## 2021-03-29 DIAGNOSIS — Z12.31 VISIT FOR SCREENING MAMMOGRAM: ICD-10-CM

## 2021-03-29 DIAGNOSIS — R76.11 MANTOUX: POSITIVE, TREATED: ICD-10-CM

## 2021-03-29 DIAGNOSIS — Z00.00 ROUTINE GENERAL MEDICAL EXAMINATION AT A HEALTH CARE FACILITY: Primary | ICD-10-CM

## 2021-03-29 DIAGNOSIS — E78.5 DYSLIPIDEMIA: ICD-10-CM

## 2021-03-29 DIAGNOSIS — Z11.4 ENCOUNTER FOR SCREENING FOR HIV: ICD-10-CM

## 2021-03-29 LAB
ANION GAP SERPL CALCULATED.3IONS-SCNC: 3 MMOL/L (ref 3–14)
BUN SERPL-MCNC: 14 MG/DL (ref 7–30)
CALCIUM SERPL-MCNC: 9.1 MG/DL (ref 8.5–10.1)
CHLORIDE SERPL-SCNC: 100 MMOL/L (ref 94–109)
CHOLEST SERPL-MCNC: 287 MG/DL
CO2 SERPL-SCNC: 29 MMOL/L (ref 20–32)
CREAT SERPL-MCNC: 0.76 MG/DL (ref 0.52–1.04)
FERRITIN SERPL-MCNC: 167 NG/ML (ref 8–252)
GFR SERPL CREATININE-BSD FRML MDRD: 83 ML/MIN/{1.73_M2}
GLUCOSE SERPL-MCNC: 91 MG/DL (ref 70–99)
HDLC SERPL-MCNC: 98 MG/DL
HGB BLD-MCNC: 12.1 G/DL (ref 11.7–15.7)
HIV 1+2 AB+HIV1 P24 AG SERPL QL IA: NONREACTIVE
IRON SATN MFR SERPL: 39 % (ref 15–46)
IRON SERPL-MCNC: 118 UG/DL (ref 35–180)
LDLC SERPL CALC-MCNC: 167 MG/DL
NONHDLC SERPL-MCNC: 189 MG/DL
POTASSIUM SERPL-SCNC: 3.7 MMOL/L (ref 3.4–5.3)
SODIUM SERPL-SCNC: 132 MMOL/L (ref 133–144)
TIBC SERPL-MCNC: 304 UG/DL (ref 240–430)
TRIGL SERPL-MCNC: 111 MG/DL

## 2021-03-29 PROCEDURE — 85018 HEMOGLOBIN: CPT | Performed by: PHYSICIAN ASSISTANT

## 2021-03-29 PROCEDURE — 83550 IRON BINDING TEST: CPT | Performed by: PHYSICIAN ASSISTANT

## 2021-03-29 PROCEDURE — 82728 ASSAY OF FERRITIN: CPT | Performed by: PHYSICIAN ASSISTANT

## 2021-03-29 PROCEDURE — 83540 ASSAY OF IRON: CPT | Performed by: PHYSICIAN ASSISTANT

## 2021-03-29 PROCEDURE — 71046 X-RAY EXAM CHEST 2 VIEWS: CPT | Performed by: RADIOLOGY

## 2021-03-29 PROCEDURE — 36415 COLL VENOUS BLD VENIPUNCTURE: CPT | Performed by: PHYSICIAN ASSISTANT

## 2021-03-29 PROCEDURE — 80061 LIPID PANEL: CPT | Performed by: PHYSICIAN ASSISTANT

## 2021-03-29 PROCEDURE — 80048 BASIC METABOLIC PNL TOTAL CA: CPT | Performed by: PHYSICIAN ASSISTANT

## 2021-03-29 PROCEDURE — 87389 HIV-1 AG W/HIV-1&-2 AB AG IA: CPT | Performed by: PHYSICIAN ASSISTANT

## 2021-03-29 PROCEDURE — 99396 PREV VISIT EST AGE 40-64: CPT | Performed by: PHYSICIAN ASSISTANT

## 2021-03-29 PROCEDURE — 99214 OFFICE O/P EST MOD 30 MIN: CPT | Mod: 25 | Performed by: PHYSICIAN ASSISTANT

## 2021-03-29 RX ORDER — AMLODIPINE BESYLATE 2.5 MG/1
2.5 TABLET ORAL DAILY
Qty: 90 TABLET | Refills: 0 | Status: SHIPPED | OUTPATIENT
Start: 2021-03-29 | End: 2021-04-29

## 2021-03-29 RX ORDER — FLUOXETINE 10 MG/1
CAPSULE ORAL
Qty: 90 CAPSULE | Refills: 3 | Status: SHIPPED | OUTPATIENT
Start: 2021-03-29 | End: 2022-05-09

## 2021-03-29 RX ORDER — LOSARTAN POTASSIUM 50 MG/1
50 TABLET ORAL 2 TIMES DAILY
Qty: 180 TABLET | Refills: 3 | Status: SHIPPED | OUTPATIENT
Start: 2021-03-29 | End: 2022-04-20

## 2021-03-29 RX ORDER — FERROUS GLUCONATE 324(37.5)
TABLET ORAL
COMMUNITY
End: 2021-06-21

## 2021-03-29 RX ORDER — AMLODIPINE BESYLATE 2.5 MG/1
2.5 TABLET ORAL DAILY
Qty: 100 TABLET | Refills: 3 | Status: SHIPPED | OUTPATIENT
Start: 2021-03-29 | End: 2021-03-29

## 2021-03-29 ASSESSMENT — ENCOUNTER SYMPTOMS
NERVOUS/ANXIOUS: 0
HEMATOCHEZIA: 0
NAUSEA: 0
JOINT SWELLING: 0
DIZZINESS: 0
BREAST MASS: 0
SHORTNESS OF BREATH: 0
PARESTHESIAS: 0
SORE THROAT: 0
HEADACHES: 0
HEARTBURN: 0
CHILLS: 0
FREQUENCY: 0
DYSURIA: 0
EYE PAIN: 0
CONSTIPATION: 1
ABDOMINAL PAIN: 0
COUGH: 0
ARTHRALGIAS: 0
DIARRHEA: 0
PALPITATIONS: 0
HEMATURIA: 0
MYALGIAS: 0
WEAKNESS: 0
FEVER: 0

## 2021-03-29 ASSESSMENT — MIFFLIN-ST. JEOR: SCORE: 1186.2

## 2021-03-29 ASSESSMENT — PATIENT HEALTH QUESTIONNAIRE - PHQ9
SUM OF ALL RESPONSES TO PHQ QUESTIONS 1-9: 0
10. IF YOU CHECKED OFF ANY PROBLEMS, HOW DIFFICULT HAVE THESE PROBLEMS MADE IT FOR YOU TO DO YOUR WORK, TAKE CARE OF THINGS AT HOME, OR GET ALONG WITH OTHER PEOPLE: NOT DIFFICULT AT ALL
SUM OF ALL RESPONSES TO PHQ QUESTIONS 1-9: 0

## 2021-03-29 NOTE — PROGRESS NOTES
SUBJECTIVE:   CC: Mery Sullivan is an 65 year old woman who presents for preventive health visit.     Patient has been advised of split billing requirements and indicates understanding: Yes  Healthy Habits:     Getting at least 3 servings of Calcium per day:  Yes    Bi-annual eye exam:  Yes    Dental care twice a year:  Yes    Sleep apnea or symptoms of sleep apnea:  None    Diet:  Regular (no restrictions)    Frequency of exercise:  1 day/week    Duration of exercise:  15-30 minutes    Taking medications regularly:  Yes    PHQ-2 Total Score: 0    Additional concerns today:  No    *  Hip pain-  L lateral hip, tender to palpation.  Has been having pain in her left hip, radiates down into her leg or back - just sometimes.  Radiates laterally down leg to knee and sometimes to low back.  Varies day to day.  Stairs can be problematic.  But others with sitting as well.  Worse evenings and sometimes wakes her. Gradually worsening x few yrs.  Has been trying to do stretches, seems to help, but doesn't full go away.    * Left ear-  States her  is complaining that she can't hear.   complaining she doesn't hear things from other room, stating he intentionally talks louder.  Non sudden.  No trauma.  No pain.  No tinnitus.  No balance issues.      * X-Ray-  Had a positive mantoux treated decades ago, needs a chest x-ray for work.  Teaching nursing assistants.  No cough or fever.  Normal xray 2018 care everywhere.    Iron def anemia.  X decades.  On iron sup.      HTN - losartan 50 bid, amlodipine 2.5.  Does monitor, averages 130/80.  Can get up to 160/90, about once a week -takes extra quarter of amlodipine if running high.  History of sensitive to BP changes in past, syncopal episode and broke arm few yrs ago.      Lipids -  last yr.  History of liver enzyme elevation with statin in past.  Past meds - atorvastatin, zetia.    Anxiety stable with prozac 10.    Today's PHQ-2 Score:   PHQ-2 ( 1999 Pfizer)  3/29/2021   Q1: Little interest or pleasure in doing things 0   Q2: Feeling down, depressed or hopeless 0   PHQ-2 Score 0   Q1: Little interest or pleasure in doing things Not at all   Q2: Feeling down, depressed or hopeless Not at all   PHQ-2 Score 0       Abuse: Current or Past (Physical, Sexual or Emotional) - No  Do you feel safe in your environment? Yes        Social History     Tobacco Use     Smoking status: Never Smoker     Smokeless tobacco: Never Used   Substance Use Topics     Alcohol use: Yes     Comment: occ       Alcohol Use 3/29/2021   Prescreen: >3 drinks/day or >7 drinks/week? No   Prescreen: >3 drinks/day or >7 drinks/week? -       Reviewed orders with patient.  Reviewed health maintenance and updated orders accordingly - Yes  Labs reviewed in EPIC  BP Readings from Last 3 Encounters:   03/29/21 130/80   02/05/20 130/76   09/16/19 128/71    Wt Readings from Last 3 Encounters:   03/29/21 66.6 kg (146 lb 12.8 oz)   02/05/20 66.4 kg (146 lb 6.4 oz)   09/16/19 68.9 kg (151 lb 12.8 oz)         Breast Cancer Screening:    FSH-7:   Breast CA Risk Assessment (FHS-7) 3/23/2021 3/23/2021 3/29/2021   Did any of your first-degree relatives have breast or ovarian cancer? No No No   Did any of your relatives have bilateral breast cancer? No No No   Did any man in your family have breast cancer? No No No   Did any woman in your family have breast and ovarian cancer? No No No   Did any woman in your family have breast cancer before age 50 y? No No No   Do you have 2 or more relatives with breast and/or ovarian cancer? No No No   Do you have 2 or more relatives with breast and/or bowel cancer? No No No     click delete button to remove this line now  Mammogram Screening: Recommended mammography every 1-2 years with patient discussion and risk factor consideration  Pertinent mammograms are reviewed under the imaging tab.    History of abnormal Pap smear: NO - age 30-65 PAP every 5 years with negative HPV co-testing  "recommended  PAP / HPV Latest Ref Rng & Units 2/5/2020 7/7/2006   PAP - NIL ASC-US(A)   HPV 16 DNA NEG:Negative Negative -   HPV 18 DNA NEG:Negative Negative -   OTHER HR HPV NEG:Negative Negative -     Reviewed and updated as needed this visit by clinical staff  Tobacco  Allergies  Meds  Problems  Med Hx  Surg Hx  Fam Hx          Reviewed and updated as needed this visit by Provider  Tobacco  Allergies  Meds  Problems  Med Hx  Surg Hx  Fam Hx           Review of Systems   Constitutional: Negative for chills and fever.   HENT: Positive for hearing loss. Negative for congestion, ear pain and sore throat.    Eyes: Negative for pain and visual disturbance.   Respiratory: Negative for cough and shortness of breath.    Cardiovascular: Negative for chest pain, palpitations and peripheral edema.   Gastrointestinal: Positive for constipation. Negative for abdominal pain, diarrhea, heartburn, hematochezia and nausea.   Breasts:  Negative for tenderness, breast mass and discharge.   Genitourinary: Negative for dysuria, frequency, genital sores, hematuria, pelvic pain, urgency, vaginal bleeding and vaginal discharge.   Musculoskeletal: Negative for arthralgias, joint swelling and myalgias.   Skin: Negative for rash.   Neurological: Negative for dizziness, weakness, headaches and paresthesias.   Psychiatric/Behavioral: Negative for mood changes. The patient is not nervous/anxious.      OBJECTIVE:   /80 (BP Location: Right arm, Patient Position: Chair, Cuff Size: Adult Regular)   Pulse 67   Resp 12   Ht 1.594 m (5' 2.76\")   Wt 66.6 kg (146 lb 12.8 oz)   LMP 06/16/2006   SpO2 97%   BMI 26.20 kg/m    Physical Exam  GENERAL APPEARANCE: healthy, alert and no distress  EYES: Eyes grossly normal to inspection, PERRL and conjunctivae and sclerae normal  HENT: ear canals R with cerumen, L clear, and TM's normal, nose and mouth without ulcers or lesions, oropharynx clear and oral mucous membranes moist  NECK: " no adenopathy, no asymmetry, masses, or scars and thyroid normal to palpation  RESP: lungs clear to auscultation - no rales, rhonchi or wheezes  BREAST: normal without masses, tenderness or nipple discharge and no palpable axillary masses or adenopathy  CV: regular rate and rhythm, normal S1 S2, no S3 or S4, no murmur, click or rub, no peripheral edema   ABDOMEN: soft, nontender, no hepatosplenomegaly, no masses and bowel sounds normal  MS: no musculoskeletal defects are noted and gait is age appropriate without ataxia  L hip: Painless passive ROM.  No tenderness except slightly posterior of greater trochanter, no pain at sciatic trigger.  Normal internal, external rotation, flexion, abduction and adduction.  Lucio neg.   SKIN: no suspicious lesions or rashes  NEURO: Normal strength and tone, sensory exam grossly normal, mentation intact and speech normal  PSYCH: mentation appears normal and affect normal/bright    Diagnostic Test Results:  Labs reviewed in Epic    ASSESSMENT/PLAN:       ICD-10-CM    1. Routine general medical examination at a health care facility  Z00.00    2. Hip pain, left  M25.552 PHYSICAL THERAPY REFERRAL   3. Hearing difficulty of left ear  H91.92 AUDIOLOGY ADULT REFERRAL   4. Mantoux: positive, treated  R76.11 XR Chest 2 Views   5. Benign essential hypertension  I10 Basic metabolic panel  (Ca, Cl, CO2, Creat, Gluc, K, Na, BUN)     losartan (COZAAR) 50 MG tablet     DISCONTINUED: amLODIPine (NORVASC) 2.5 MG tablet   6. History of iron deficiency anemia  Z86.2 Hemoglobin     Ferritin     Iron and iron binding capacity   7. Anxiety  F41.9 FLUoxetine (PROZAC) 10 MG capsule   8. Dyslipidemia  E78.5 Lipid panel reflex to direct LDL Fasting   9. Encounter for screening for HIV  Z11.4 HIV Antigen Antibody Combo   10. Visit for screening mammogram  Z12.31 *MA Screening Digital Bilateral     Patient Instructions   Xray for TB monitoring     Labs    If cholesterol high, try mediterranean diet and  recheck 3-6 months     Refilled meds    Mammogram due    shingrix vaccine sometime    [facility] Mammo Schedule  [facility] ~ [phone]  One Day Weekly- Alternating Days    [place] ~ [phone]  Every other Monday or Wednesday   & one Saturday morning a month    [place] ~ [phone]  Every Other Monday Afternoon      [place] ~ [phone]  Every Monday morning  Every Tuesday afternoon  Wed, Thurs, Friday morning & afternoon      Preventive Health Recommendations  Female Ages 50 - 64    Yearly exam: See your health care provider every year in order to  o Review health changes.   o Discuss preventive care.    o Review your medicines if your doctor has prescribed any.      Get a Pap test every three years (unless you have an abnormal result and your provider advises testing more often).    If you get Pap tests with HPV test, you only need to test every 5 years, unless you have an abnormal result.     You do not need a Pap test if your uterus was removed (hysterectomy) and you have not had cancer.    You should be tested each year for STDs (sexually transmitted diseases) if you're at risk.     Have a mammogram every 1 to 2 years.    Have a colonoscopy at age 50, or have a yearly FIT test (stool test). These exams screen for colon cancer.      Have a cholesterol test every 5 years, or more often if advised.    Have a diabetes test (fasting glucose) every three years. If you are at risk for diabetes, you should have this test more often.     If you are at risk for osteoporosis (brittle bone disease), think about having a bone density scan (DEXA).    Shots: Get a flu shot each year. Get a tetanus shot every 10 years.    Nutrition:     Eat at least 5 servings of fruits and vegetables each day.    Eat whole-grain bread, whole-wheat pasta and brown rice instead of white grains and rice.    Get adequate Calcium and Vitamin D.     Lifestyle    Exercise at least 150 minutes a week (30 minutes a day, 5 days a  "week). This will help you control your weight and prevent disease.    Limit alcohol to one drink per day.    No smoking.     Wear sunscreen to prevent skin cancer.     See your dentist every six months for an exam and cleaning.    See your eye doctor every 1 to 2 years.        Patient has been advised of split billing requirements and indicates understanding: Yes  COUNSELING:  Reviewed preventive health counseling, as reflected in patient instructions    Estimated body mass index is 26.2 kg/m  as calculated from the following:    Height as of this encounter: 1.594 m (5' 2.76\").    Weight as of this encounter: 66.6 kg (146 lb 12.8 oz).        She reports that she has never smoked. She has never used smokeless tobacco.      Counseling Resources:  ATP IV Guidelines  Pooled Cohorts Equation Calculator  Breast Cancer Risk Calculator  BRCA-Related Cancer Risk Assessment: FHS-7 Tool  FRAX Risk Assessment  ICSI Preventive Guidelines  Dietary Guidelines for Americans, 2010  USDA's MyPlate  ASA Prophylaxis  Lung CA Screening    Melissa Ely PA-C  [facility]    "

## 2021-03-29 NOTE — RESULT ENCOUNTER NOTE
Mery,    Sodium is ok, borderline normal.  Potassium and kidney function are fine.    Hemoglobin and iron labs are normal.    LDL cholesterol improved quite a bit.  It is still high but not high enough to need medication.  Try the Mediterranean diet.    Melissa

## 2021-03-29 NOTE — TELEPHONE ENCOUNTER
Mery calls and says that she is returning her clinic's call. RN then checked Epic and read to pt. The message that a clinic nurse left about pt's Amlodipine. Pt. Voiced understanding. COVID 19 Nurse Triage Plan/Patient Instructions    Please be aware that novel coronavirus (COVID-19) may be circulating in the community. If you develop symptoms such as fever, cough, or SOB or if you have concerns about the presence of another infection including coronavirus (COVID-19), please contact your health care provider or visit [email or web address].     Disposition/Instructions    Home care recommended. Follow home care protocol based instructions.    Thank you for taking steps to prevent the spread of this virus.  o Limit your contact with others.  o Wear a simple mask to cover your cough.  o Wash your hands well and often.    Resources    [facility]: About COVID-19: [email or web address]    CDC: What to Do If You're Sick: www.cdc.gov/coronavirus/2019-ncov/about/steps-when-sick.html    CDC: Ending Home Isolation: www.cdc.gov/coronavirus/2019-ncov/hcp/disposition-in-home-patients.html     CDC: Caring for Someone: www.cdc.gov/coronavirus/2019-ncov/if-you-are-sick/care-for-someone.html     [facility]: Interim Guidance for Hospital Discharge to Home: www.health.[facility].mn.us/diseases/coronavirus/hcp/hospdischarge.pdf    [facility] clinical trials (COVID-19 research studies): clinicalaffairs.[facility]-clinical-trials     Below are the COVID-19 hotlines at the [facility] of Health ([facility]). Interpreters are available.   o For health questions: Call 944-384-5121 or 1-775.892.7472 (7 a.m. to 7 p.m.)  o For questions about schools and childcare: Call 336-278-5775 or 1-467.195.9956 (7 a.m. to 7 p.m.)                     Additional Information    Negative: [1] Caller is not with the adult (patient) AND [2] reporting urgent symptoms    Negative: Lab result questions    Negative: Medication  questions    Negative: Caller can't be reached by phone    Negative: Caller has already spoken to PCP or another triager    Negative: RN needs further essential information from caller in order to complete triage    Negative: Requesting regular office appointment    Negative: [1] Caller requesting NON-URGENT health information AND [2] PCP's office is the best resource    Negative: Health Information question, no triage required and triager able to answer question    Negative: General information question, no triage required and triager able to answer question    Negative: Question about upcoming scheduled test, no triage required and triager able to answer question    Negative: [1] Caller is not with the adult (patient) AND [2] probable NON-URGENT symptoms    [1] Follow-up call to recent contact AND [2] information only call, no triage required    Protocols used: INFORMATION ONLY CALL-A-

## 2021-03-29 NOTE — NURSING NOTE
"Chief Complaint   Patient presents with     Physical       Initial /80 (BP Location: Right arm, Patient Position: Chair, Cuff Size: Adult Regular)   Pulse 67   Resp 12   Wt 66.6 kg (146 lb 12.8 oz)   LMP 06/16/2006   SpO2 97%   BMI 26.21 kg/m   Estimated body mass index is 26.21 kg/m  as calculated from the following:    Height as of 2/5/20: 1.594 m (5' 2.76\").    Weight as of this encounter: 66.6 kg (146 lb 12.8 oz).    Patient presents to the clinic using No DME    Health Maintenance that is potentially due pending provider review:  Mammogram        Is there anyone who you would like to be able to receive your results? No  If yes have patient fill out LONG      "

## 2021-03-29 NOTE — TELEPHONE ENCOUNTER
left message for patient to return call.  Insurance does not cover the amlodipine for more than one per day, so changed to #90  Shagufta Macias RN

## 2021-03-29 NOTE — PATIENT INSTRUCTIONS
Xray for TB monitoring     Labs    If cholesterol high, try mediterranean diet and recheck 3-6 months     Refilled meds    Mammogram due    shingrix vaccine sometime    [facility] Mammo Schedule  [facility] ~ [phone]  One Day Weekly- Alternating Days    [place] ~ [phone]  Every other Monday or Wednesday   & one Saturday morning a month    [place] ~ [phone]  Every Other Monday Afternoon      [place] ~ [phone]  Every Monday morning  Every Tuesday afternoon  Wed, Thurs, Friday morning & afternoon      Preventive Health Recommendations  Female Ages 50 - 64    Yearly exam: See your health care provider every year in order to  o Review health changes.   o Discuss preventive care.    o Review your medicines if your doctor has prescribed any.      Get a Pap test every three years (unless you have an abnormal result and your provider advises testing more often).    If you get Pap tests with HPV test, you only need to test every 5 years, unless you have an abnormal result.     You do not need a Pap test if your uterus was removed (hysterectomy) and you have not had cancer.    You should be tested each year for STDs (sexually transmitted diseases) if you're at risk.     Have a mammogram every 1 to 2 years.    Have a colonoscopy at age 50, or have a yearly FIT test (stool test). These exams screen for colon cancer.      Have a cholesterol test every 5 years, or more often if advised.    Have a diabetes test (fasting glucose) every three years. If you are at risk for diabetes, you should have this test more often.     If you are at risk for osteoporosis (brittle bone disease), think about having a bone density scan (DEXA).    Shots: Get a flu shot each year. Get a tetanus shot every 10 years.    Nutrition:     Eat at least 5 servings of fruits and vegetables each day.    Eat whole-grain bread, whole-wheat pasta and brown rice instead of white grains and rice.    Get adequate Calcium and Vitamin D.      Lifestyle    Exercise at least 150 minutes a week (30 minutes a day, 5 days a week). This will help you control your weight and prevent disease.    Limit alcohol to one drink per day.    No smoking.     Wear sunscreen to prevent skin cancer.     See your dentist every six months for an exam and cleaning.    See your eye doctor every 1 to 2 years.

## 2021-03-30 NOTE — RESULT ENCOUNTER NOTE
"Mery,    Xray shows some granulomas.  These were not noted on the chest xrays of 2014 and 2018.  It can be nothing and is likely nothing as granulomas are common.  That said, your history of treatment for latent TB raises the question a bit.  Are you ok with me sending an \"E consult\" to pulmonology?  This is generally covered by insurance, otherwise I believe is $118?    Let me know as I cannot send E consult without your permission.    Melissa"

## 2021-04-06 ENCOUNTER — HOSPITAL ENCOUNTER (OUTPATIENT)
Dept: MAMMOGRAPHY | Facility: CLINIC | Age: 66
Discharge: HOME | End: 2021-04-06
Attending: PHYSICIAN ASSISTANT | Admitting: PHYSICIAN ASSISTANT
Payer: COMMERCIAL

## 2021-04-06 DIAGNOSIS — Z12.31 VISIT FOR SCREENING MAMMOGRAM: ICD-10-CM

## 2021-04-06 PROCEDURE — 77067 SCR MAMMO BI INCL CAD: CPT

## 2021-04-07 ENCOUNTER — E-CONSULT (OUTPATIENT)
Dept: PULMONOLOGY | Facility: CLINIC | Age: 66
End: 2021-04-07
Payer: COMMERCIAL

## 2021-04-07 PROCEDURE — 99451 NTRPROF PH1/NTRNET/EHR 5/>: CPT | Performed by: INTERNAL MEDICINE

## 2021-04-07 NOTE — PROGRESS NOTES
"4/7/2021     E-Consult has been accepted.    Interprofessional consultation requested by:  Melissa Ely PA-C      Clinical Question/Purpose: Eval lung granulomas seen on CXR  in patient with hx of previously prophlaxed latent TB.    Patient assessment and information reviewed: Reviewed PCP note of 3/29/21 and also personally viewed the CXR images from same date.    Recommendations:    I agree with Dr. Ely that the CXR findings probably do not represent active TB; however cannot be certain.  Therefore, recommend:    1) Would ask radiology to have the patient's 8/21/18 CXR \"pushed\" to our system from [facility]. If this can be done, and the CXR is unchanged, and patient is asymptomatic--that is all I would do.     2) If 8/21/18 images can't been sent to our system and/or we get the images and the granulomata are actually \"new\" since 2018, would order a \"low dose, non-contrast chest CT\"    3) if patient develops cough, sputum, fever would eval for active TB since there can be relapses/reinfections after preventive treatment.    Please let me know if/when the outside images or new CT is available and I'll take a look.    Thanks, MH      The recommendations provided in this E-Consult are based on the clinical data available to me at this time, and are furnished without the benefit of a comprehensive in-person or virtual patient evaluation, Any new clinical issues or changes in patient status since the filing of this E-Consult will need to be taken into account when assessing these recommendations. Please contact me if you have further questions.    My total time spent reviewing clinical information and formulating assessment was 25 minutes.    Report sent automatically to requesting provider once signed.         Oc Gilliam MD  "

## 2021-04-12 ENCOUNTER — HOSPITAL ENCOUNTER (OUTPATIENT)
Dept: PHYSICAL THERAPY | Facility: CLINIC | Age: 66
Setting detail: THERAPIES SERIES
End: 2021-04-12
Attending: PHYSICIAN ASSISTANT
Payer: COMMERCIAL

## 2021-04-12 DIAGNOSIS — M25.552 HIP PAIN, LEFT: ICD-10-CM

## 2021-04-12 PROCEDURE — 97110 THERAPEUTIC EXERCISES: CPT | Mod: GP | Performed by: PHYSICAL MEDICINE & REHABILITATION

## 2021-04-12 PROCEDURE — 97140 MANUAL THERAPY 1/> REGIONS: CPT | Mod: GP | Performed by: PHYSICAL MEDICINE & REHABILITATION

## 2021-04-12 PROCEDURE — 97161 PT EVAL LOW COMPLEX 20 MIN: CPT | Mod: GP | Performed by: PHYSICAL MEDICINE & REHABILITATION

## 2021-04-12 NOTE — PROGRESS NOTES
"   04/12/21 0900   General Information   Type of Visit Initial OP Ortho PT Evaluation   Start of Care Date 04/12/21   Referring Physician Melissa Ely PA-C   Patient/Family Goals Statement get some strengthening and stretching to work on at home   Orders Evaluate and Treat   Date of Order 03/29/21   Certification Required? No  (HP)   Medical Diagnosis Hip pain, left    Surgical/Medical history reviewed Yes   Precautions/Limitations no known precautions/limitations   General Information Comments PMHx per pt report: unremarkable   Body Part(s)   Body Part(s) Hip   Presentation and Etiology   Pertinent history of current problem (include personal factors and/or comorbidities that impact the POC) Pt arrived to PT today for L hip pain that started \"over time.\" Notes stretches can help, but can also make pain worse. States pain mostly deep within L hip jt, can radiate down into L thigh and into L low back. Pt reports she is only looking to come 1-2 visits to get exercises to work on at home.    Impairments A. Pain;B. Decreased WB tolerance;E. Decreased flexibility;F. Decreased strength and endurance;H. Impaired gait   Functional Limitations perform activities of daily living;perform required work activities;perform desired leisure / sports activities   Symptom Location L hip   How/Where did it occur From insidious onset   Onset date of current episode/exacerbation 04/12/20  (started over a year ago)   Chronicity Chronic   Pain rating (0-10 point scale) Best (/10);Worst (/10)   Best (/10) 0   Worst (/10) 8   Pain quality C. Aching;E. Shooting   Frequency of pain/symptoms A. Constant   Pain/symptoms are: Other   Pain symptoms comment depends on the day   Pain/symptoms exacerbated by A. Sitting;M. Other   Pain exacerbation comment stairs   Pain/symptoms eased by K. Other   Pain eased by comment sometimes stretches   Progression of symptoms since onset: Worsened   Prior Level of Function   Prior Level of " Function-Mobility independent   Prior Level of Function-ADLs independent   Current Level of Function   Current Community Support Family/friend caregiver   Patient role/employment history A. Employed   Employment Comments worse as nurse educator   Living environment House/townhome   Home/community accessibility notes split level home, railings   Current equipment-Gait/Locomotion None   Fall Risk Screen   Fall screen completed by PT   Have you fallen 2 or more times in the past year? No   Have you fallen and had an injury in the past year? No   Is patient a fall risk? No   Abuse Screen (yes response referral indicated)   Feels Unsafe at Home or Work/School no   Feels Threatened by Someone no   Does Anyone Try to Keep You From Having Contact with Others or Doing Things Outside Your Home? no   Physical Signs of Abuse Present no   Functional Scales   Functional Scales Other   Other Scales  LEFS: incomplete   Hip Objective Findings   Side (if bilateral, select both right and left) Left   Posture pt sits in slight slouched posture   Gait/Locomotion slight hip IR B   Lumbar ROM wfl   Pelvic Screen level innominates   Straight Leg Raise Test neg   Scour Test slight pain   LILIAN Test slight pain   FADIR Test slight pain   Palpation pain located over greater trochanter and positive Csign   Accessory Motion/Joint Mobility hypomobility of L hip jt   Left Hip Flexion PROM wnl   Left Hip Abduction PROM wnl, notes stretch   Left Hip Adduction PROM wnl   Left Hip ER PROM slight limitation   Left Hip IR PROM excessive IR   Left Hip Ext PROM slight limitation   Left Hip Flexion Strength 5-/5, pain   Left Hip Abduction Strength seated: 5/5   Left Hip Extension Strength able to do supine bridge   Left Hip IR Strength seated: 4+/5   Left Hip ER Strength seated: 4+/5   Left Knee Flexion Strength 5/5   Left Knee Extension Strength 5/5   Jesus Flexibility Test limited hip flexors   Left Hamstring Flexibility wnl   Left Piriformis  Flexibility min limitation   Planned Therapy Interventions   Planned Therapy Interventions gait training;joint mobilization;manual therapy;motor coordination training;neuromuscular re-education;ROM;strengthening;stretching;transfer training   Planned Modality Interventions   Planned Modality Interventions Cryotherapy;Electrical stimulation;Hot packs;TENS;Traction;Ultrasound   Planned Modality Interventions Comments only as needed   Clinical Impression   Criteria for Skilled Therapeutic Interventions Met yes, treatment indicated   PT Diagnosis L hip OA   Influenced by the following impairments decreased ROM, decreased strength, impaired gait, pain   Functional limitations due to impairments stairs, sitting   Clinical Presentation Stable/Uncomplicated   Clinical Presentation Rationale ROM, strength, few comorbidities, clinical judgement   Clinical Decision Making (Complexity) Low complexity   Therapy Frequency 1 time/week   Predicted Duration of Therapy Intervention (days/wks) 4 weeks   Risk & Benefits of therapy have been explained Yes   Patient, Family & other staff in agreement with plan of care Yes   Clinical Impression Comments Pt is a 65 y.o. female who presented to the PT clinic today with a rehab diagnosis of L hip pain secondary to OA as evidenced by decreased ROM, decreaed strength, impaired gait and pain. Pt is appropriate for skilled PT to address previously listed impairments in order to decresae difficulty with sitting and stairs.    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 w   Target Date 04/26/21   Ortho Goal 2   Goal Identifier 2   Goal Description Pt will be able to ascend/descend stairs without pain in order to  decrease difficulty with home navigation.    Target Date 05/14/21   Ortho Goal 3   Goal Identifier 3   Goal Description Pt will be independent with HEP in order to self manage symptoms.    Target Date 05/14/21   Total Evaluation Time   PT Eval, Low Complexity Minutes (11181) 15       Please contact me with any questions or concerns.    Thank you for your referral,     Andie Pradhan, PT, DPT  Physical Therapist  [address] [address] [phone]

## 2021-04-21 ENCOUNTER — TELEPHONE (OUTPATIENT)
Dept: FAMILY MEDICINE | Facility: CLINIC | Age: 66
End: 2021-04-21
Payer: COMMERCIAL

## 2021-04-22 NOTE — TELEPHONE ENCOUNTER
Dr Gilliam on 4/12 I routed you a follow up to our [facility]ult from 4/7.  You had request to be be updated on when outside images were available, that you'd take a look on this patient with history latent TB treatment, now with xrays showing granulomas that seemed to be new.  Can you look soon so that I can get back to the patient on this?    Note from 4/12     Dr Gilliam,   I had radiology take a look at the images and they are now stating that the granulomas are NOT new.    Confirm on whether Mery needs any further work up?  Melissa

## 2021-04-28 NOTE — TELEPHONE ENCOUNTER
"Bre, this was a patient that had images \"pushed\" and radiology verbally re-read for me.  How do we access the actual images?  Pulmonology asking me where images are.  2014 and 2018 CXR [facility].    "

## 2021-04-29 ENCOUNTER — TELEPHONE (OUTPATIENT)
Dept: FAMILY MEDICINE | Facility: CLINIC | Age: 66
End: 2021-04-29

## 2021-04-29 DIAGNOSIS — I10 BENIGN ESSENTIAL HYPERTENSION: ICD-10-CM

## 2021-04-29 DIAGNOSIS — Z86.2 HISTORY OF IRON DEFICIENCY ANEMIA: Primary | ICD-10-CM

## 2021-04-29 RX ORDER — AMLODIPINE BESYLATE 2.5 MG/1
2.5 TABLET ORAL DAILY
Qty: 90 TABLET | Refills: 3 | Status: SHIPPED | OUTPATIENT
Start: 2021-04-29 | End: 2022-02-28

## 2021-04-29 RX ORDER — FERROUS GLUCONATE 324(38)MG
TABLET ORAL
Qty: 180 TABLET | Refills: 1 | Status: SHIPPED | OUTPATIENT
Start: 2021-04-29 | End: 2022-06-27

## 2021-04-29 NOTE — TELEPHONE ENCOUNTER
Prior Authorization Retail Medication Request    Medication/Dose: amlodipine  ICD code (if different than what is on RX):    Previously Tried and Failed:    Rationale:      Insurance Name:  [employer]  Insurance ID:  [id number]  [phone]      1 per day per ins, quantity override      Thank You,  Lissett Plummer, [facility]

## 2021-04-29 NOTE — TELEPHONE ENCOUNTER
They are there under patient images. They are listed as CR out.  Or They can view in PACS under CR- X Digital archive.

## 2021-05-05 ENCOUNTER — MYC MEDICAL ADVICE (OUTPATIENT)
Dept: FAMILY MEDICINE | Facility: CLINIC | Age: 66
End: 2021-05-05

## 2021-05-05 DIAGNOSIS — J98.4 CALCIFIED GRANULOMA OF LUNG: ICD-10-CM

## 2021-05-05 DIAGNOSIS — R76.11 MANTOUX: POSITIVE, TREATED: Primary | ICD-10-CM

## 2021-05-10 ENCOUNTER — MYC MEDICAL ADVICE (OUTPATIENT)
Dept: FAMILY MEDICINE | Facility: CLINIC | Age: 66
End: 2021-05-10

## 2021-05-10 PROBLEM — J98.4 CALCIFIED GRANULOMA OF LUNG: Status: ACTIVE | Noted: 2021-05-10

## 2021-05-10 NOTE — TELEPHONE ENCOUNTER
My reading of the 3/29/21 CXR is that it shows an increased opacity in the RLL compared with 8/202018. I would recommend a non-contrast, low-dose CT to better characterize this. If you would like patient to be seen in our lung nodule clinic I can help get that set up; but would rec the CT be done first.     Thanks, EMIL

## 2021-05-12 NOTE — TELEPHONE ENCOUNTER
RECORDS STATUS - ALL OTHER DIAGNOSIS      RECORDS RECEIVED FROM: [facility]   DATE RECEIVED: 5/12   NOTES STATUS DETAILS   OFFICE NOTE from referring provider Melissa Sanders PA-C in [facility]: 3/29/21   OFFICE NOTE from medical oncologist     DISCHARGE SUMMARY from hospital     DISCHARGE REPORT from the ER     OPERATIVE REPORT Epic 8/25/18: Colonoscopy   MEDICATION LIST [facility]    CLINICAL TRIAL TREATMENTS TO DATE     LABS     PATHOLOGY REPORTS     ANYTHING RELATED TO DIAGNOSIS Epic 3/29/21   GENONOMIC TESTING     TYPE:     IMAGING (NEED IMAGES & REPORT)     XR PACS 3/29/21: [facility]   CT SCANS Scheduled @  5/14/21: [facility]   MRI     MAMMO PACS 4/6/21: Epic   ULTRASOUND     PET

## 2021-05-14 ENCOUNTER — HOSPITAL ENCOUNTER (OUTPATIENT)
Dept: CT IMAGING | Facility: CLINIC | Age: 66
Discharge: HOME | End: 2021-05-14
Attending: PHYSICIAN ASSISTANT | Admitting: PHYSICIAN ASSISTANT
Payer: COMMERCIAL

## 2021-05-14 DIAGNOSIS — J98.4 CALCIFIED GRANULOMA OF LUNG: ICD-10-CM

## 2021-05-14 DIAGNOSIS — R76.11 MANTOUX: POSITIVE, TREATED: ICD-10-CM

## 2021-05-14 PROCEDURE — 250N000011 HC RX IP 250 OP 636: Performed by: PHYSICIAN ASSISTANT

## 2021-05-14 PROCEDURE — 250N000009 HC RX 250: Performed by: PHYSICIAN ASSISTANT

## 2021-05-14 PROCEDURE — 71260 CT THORAX DX C+: CPT

## 2021-05-14 RX ORDER — IOPAMIDOL 755 MG/ML
80 INJECTION, SOLUTION INTRAVASCULAR ONCE
Status: DISCONTINUED | OUTPATIENT
Start: 2021-05-14 | End: 2021-05-14

## 2021-05-14 RX ORDER — IOPAMIDOL 755 MG/ML
80 INJECTION, SOLUTION INTRAVASCULAR ONCE
Status: COMPLETED | OUTPATIENT
Start: 2021-05-14 | End: 2021-05-14

## 2021-05-14 RX ADMIN — SODIUM CHLORIDE 80 ML: 9 INJECTION, SOLUTION INTRAVENOUS at 11:28

## 2021-05-14 RX ADMIN — IOPAMIDOL 80 ML: 755 INJECTION, SOLUTION INTRAVENOUS at 11:27

## 2021-05-17 NOTE — RESULT ENCOUNTER NOTE
Mery,    The CT results will hopefully help pulmonology in helping resolve the questions surrounding your TB history and this granuloma.  I'm glad you were able to get such a quick appointment after the delays in an answer from the E consult.  I look forward to hearing what pulmonology decides.    Melissa

## 2021-05-19 ENCOUNTER — VIRTUAL VISIT (OUTPATIENT)
Dept: PULMONOLOGY | Facility: CLINIC | Age: 66
End: 2021-05-19
Attending: PHYSICIAN ASSISTANT
Payer: COMMERCIAL

## 2021-05-19 ENCOUNTER — PRE VISIT (OUTPATIENT)
Dept: PULMONOLOGY | Facility: CLINIC | Age: 66
End: 2021-05-19

## 2021-05-19 DIAGNOSIS — R91.8 PULMONARY NODULES: Primary | ICD-10-CM

## 2021-05-19 PROCEDURE — 999N001193 HC VIDEO/TELEPHONE VISIT; NO CHARGE

## 2021-05-19 PROCEDURE — 99203 OFFICE O/P NEW LOW 30 MIN: CPT | Mod: GT | Performed by: INTERNAL MEDICINE

## 2021-05-19 NOTE — LETTER
"5/19/2021      RE: Mery Sullivan  [id number] [facility] [id number]       Mery is a 65 year old who is being evaluated via a billable video visit.      How would you like to obtain your AVS? MyChart  If the video visit is dropped, the invitation should be resent by: Send to e-mail at: [email or web address]  Will anyone else be joining your video visit? No    Vitals - Patient Reported  Weight (Patient Reported): 65.8 kg (145 lb)  Height (Patient Reported): 160 cm (5' 3\")  BMI (Based on Pt Reported Ht/Wt): 25.69  Pain Score: No Pain (0)      Video-Visit Details    Type of service:  Video Visit    Video Time: 8 minutes    Originating Location (pt. Location): Home    Distant Location (provider location):  [facility]     Platform used for Video Visit: [facility]  Interventional Pulmonary Clinic Virtual Visit Note    May 19, 2021    Chief complaint:  Mery Sullivan is a 65 year old female seen for   Chief Complaint   Patient presents with     Video Visit     NEW; mantoux; positive, treated, calcified granuloma of lung       Reason for clinic visit / Chief complaint:   Pulmonary nodule    Assessment and Plan:  Pulmonary nodule in the left lower lobe, measuring 6 mm, calcified likely representing old granuloma.  There is no other nodules or lymph nodes on recent CT scan from 5/14/2021.  We discussed possible etiologies.  I assured her that this is a benign nodule and requires no further follow-up.  History of TB exposure from a patient 40 years ago, positive mental test.  She was treated with a single medication for a year.  Small pericardial effusion I will defer further work-up by her primary care provider.      Billing: Based on Medical Decision Making (Complexity):  L3    History of Present Illness:  This is a 65 years old woman recently found to have a pulmonary nodule after she had a chest x-ray in March 2021 revealing calcified granuloma and " a previous chest x-ray revealing normal lung parenchyma in 2018.  She underwent CT chest in May 2021 with the above findings.  She has history of TB exposure and treatment as above.  She has no respiratory symptoms.  No history of smoking.  She was referred to our clinic for further evaluation and review of her CT chest.       Allergies   Allergen Reactions     Atenolol      Cold hands and feet     Atorvastatin Other (See Comments)     Elevated liver enzymes      Dyazide [Hydrochlorothiazide W-Triamterene]      Caused cramps     Lisinopril      Worked for blood pressure but over time caused chest discomfort     Sulfa Drugs Rash     fever        Past Medical History:   Diagnosis Date     Anemia, unspecified      Cancer (H) Maternal grandmother     Unspecified essential hypertension         Past Surgical History:   Procedure Laterality Date     ORTHOPEDIC SURGERY  Right upper arm        Social History     Socioeconomic History     Marital status:      Spouse name: Not on file     Number of children: Not on file     Years of education: Not on file     Highest education level: Not on file   Occupational History     Not on file   Social Needs     Financial resource strain: Not on file     Food insecurity     Worry: Not on file     Inability: Not on file     Transportation needs     Medical: Not on file     Non-medical: Not on file   Tobacco Use     Smoking status: Never Smoker     Smokeless tobacco: Never Used   Substance and Sexual Activity     Alcohol use: Yes     Comment: occ     Drug use: No     Sexual activity: Yes     Partners: Male     Birth control/protection: Post-menopausal   Lifestyle     Physical activity     Days per week: Not on file     Minutes per session: Not on file     Stress: Not on file   Relationships     Social connections     Talks on phone: Not on file     Gets together: Not on file     Attends Restorationist service: Not on file     Active member of club or organization: Not on file      Attends meetings of clubs or organizations: Not on file     Relationship status: Not on file     Intimate partner violence     Fear of current or ex partner: Not on file     Emotionally abused: Not on file     Physically abused: Not on file     Forced sexual activity: Not on file   Other Topics Concern     Parent/sibling w/ CABG, MI or angioplasty before 65F 55M? 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        Family History   Problem Relation Age of Onset     Heart Disease Father         CHF     Hypertension Father      Cerebrovascular Disease Mother      Osteoporosis Mother      Hypertension Mother      Cancer - colorectal Maternal Grandmother      Allergies Sister      Obesity Sister      Obesity Brother         Immunization History   Administered Date(s) Administered     COVID-19,PF,Pfizer 03/05/2021, 03/26/2021     DTaP, Unspecified 07/10/2015     Influenza Quad, Recombinant, p-free (RIV4) 09/16/2019     Influenza Vaccine IM > 6 months Valent IIV4 10/17/2018       Current Outpatient Medications   Medication Sig     amLODIPine (NORVASC) 2.5 MG tablet Take 1 tablet (2.5 mg) by mouth daily     calcium carbonate 600 mg-vitamin D 400 units (CALTRATE) 600-400 MG-UNIT per tablet Take 1 tablet by mouth 2 times daily     ferrous gluconate (FERGON) 324 (38 Fe) MG tablet TAKE ONE TABLET BY MOUTH TWICE A DAY     FLUoxetine (PROZAC) 10 MG capsule TAKE ONE CAPSULE BY MOUTH EVERY MORNING.     losartan (COZAAR) 50 MG tablet Take 1 tablet (50 mg) by mouth 2 times daily     Multiple Vitamin (MULTI-VITAMINS) TABS Take 1 tablet by mouth daily     vitamin C (ASCORBIC ACID) 100 MG tablet Take 1,000 mg by mouth daily      Ferrous Gluconate 324 (37.5 Fe) MG TABS      No current facility-administered medications for this visit.         Review of Systems:  I have done 10 points of review systems and all negative except for those mentioned in HPI    Physical  examination  Constitutional: Oriented, not in distress  Head and neck: normal posture and movements  Respiratory: Normal tidal breathing, no shortness of breath, no audible wheezing or stridor over the phone or video visit  Neurological: Alert, orientedx3, no motor deficits  Psychiatric:  Mood and affect are appropriate with insight into his/her medical condition    Data:  Lab Results   Component Value Date    WBC 4.5 01/22/2020     Lab Results   Component Value Date    RBC 3.65 01/22/2020     Lab Results   Component Value Date    HGB 12.1 03/29/2021     Lab Results   Component Value Date    HCT 35.2 01/22/2020     Lab Results   Component Value Date    MCV 96 01/22/2020     Lab Results   Component Value Date    MCH 32.1 01/22/2020     Lab Results   Component Value Date    MCHC 33.2 01/22/2020     Lab Results   Component Value Date    RDW 11.3 01/22/2020     Lab Results   Component Value Date     01/22/2020       Lab Results   Component Value Date     03/29/2021      Lab Results   Component Value Date    POTASSIUM 3.7 03/29/2021     Lab Results   Component Value Date    CHLORIDE 100 03/29/2021     Lab Results   Component Value Date    RADHA 9.1 03/29/2021     Lab Results   Component Value Date    CO2 29 03/29/2021     Lab Results   Component Value Date    BUN 14 03/29/2021     Lab Results   Component Value Date    CR 0.76 03/29/2021     Lab Results   Component Value Date    GLC 91 03/29/2021         INDERJIT Poole MD

## 2021-05-19 NOTE — PROGRESS NOTES
"Mery is a 65 year old who is being evaluated via a billable video visit.      How would you like to obtain your AVS? MyChart  If the video visit is dropped, the invitation should be resent by: Send to e-mail at: [email or web address]  Will anyone else be joining your video visit? No    Vitals - Patient Reported  Weight (Patient Reported): 65.8 kg (145 lb)  Height (Patient Reported): 160 cm (5' 3\")  BMI (Based on Pt Reported Ht/Wt): 25.69  Pain Score: No Pain (0)      Video-Visit Details    Type of service:  Video Visit    Video Time: 8 minutes    Originating Location (pt. Location): Home    Distant Location (provider location):  [facility]     Platform used for Video Visit: [facility]  Interventional Pulmonary Clinic Virtual Visit Note    May 19, 2021    Chief complaint:  Mery Sullivan is a 65 year old female seen for   Chief Complaint   Patient presents with     Video Visit     NEW; mantoux; positive, treated, calcified granuloma of lung       Reason for clinic visit / Chief complaint:   Pulmonary nodule    Assessment and Plan:  Pulmonary nodule in the left lower lobe, measuring 6 mm, calcified likely representing old granuloma.  There is no other nodules or lymph nodes on recent CT scan from 5/14/2021.  We discussed possible etiologies.  I assured her that this is a benign nodule and requires no further follow-up.  History of TB exposure from a patient 40 years ago, positive mental test.  She was treated with a single medication for a year.  Small pericardial effusion I will defer further work-up by her primary care provider.      Billing: Based on Medical Decision Making (Complexity):  L3    History of Present Illness:  This is a 65 years old woman recently found to have a pulmonary nodule after she had a chest x-ray in March 2021 revealing calcified granuloma and a previous chest x-ray revealing normal lung parenchyma in 2018.  She underwent CT " chest in May 2021 with the above findings.  She has history of TB exposure and treatment as above.  She has no respiratory symptoms.  No history of smoking.  She was referred to our clinic for further evaluation and review of her CT chest.       Allergies   Allergen Reactions     Atenolol      Cold hands and feet     Atorvastatin Other (See Comments)     Elevated liver enzymes      Dyazide [Hydrochlorothiazide W-Triamterene]      Caused cramps     Lisinopril      Worked for blood pressure but over time caused chest discomfort     Sulfa Drugs Rash     fever        Past Medical History:   Diagnosis Date     Anemia, unspecified      Cancer (H) Maternal grandmother     Unspecified essential hypertension         Past Surgical History:   Procedure Laterality Date     ORTHOPEDIC SURGERY  Right upper arm        Social History     Socioeconomic History     Marital status:      Spouse name: Not on file     Number of children: Not on file     Years of education: Not on file     Highest education level: Not on file   Occupational History     Not on file   Social Needs     Financial resource strain: Not on file     Food insecurity     Worry: Not on file     Inability: Not on file     Transportation needs     Medical: Not on file     Non-medical: Not on file   Tobacco Use     Smoking status: Never Smoker     Smokeless tobacco: Never Used   Substance and Sexual Activity     Alcohol use: Yes     Comment: occ     Drug use: No     Sexual activity: Yes     Partners: Male     Birth control/protection: Post-menopausal   Lifestyle     Physical activity     Days per week: Not on file     Minutes per session: Not on file     Stress: Not on file   Relationships     Social connections     Talks on phone: Not on file     Gets together: Not on file     Attends Islam service: Not on file     Active member of club or organization: Not on file     Attends meetings of clubs or organizations: Not on file     Relationship status: Not on  file     Intimate partner violence     Fear of current or ex partner: Not on file     Emotionally abused: Not on file     Physically abused: Not on file     Forced sexual activity: Not on file   Other Topics Concern     Parent/sibling w/ CABG, MI or angioplasty before 65F 55M? 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.        Family History   Problem Relation Age of Onset     Heart Disease Father         CHF     Hypertension Father      Cerebrovascular Disease Mother      Osteoporosis Mother      Hypertension Mother      Cancer - colorectal Maternal Grandmother      Allergies Sister      Obesity Sister      Obesity Brother         Immunization History   Administered Date(s) Administered     COVID-19,PF,Pfizer 03/05/2021, 03/26/2021     DTaP, Unspecified 07/10/2015     Influenza Quad, Recombinant, p-free (RIV4) 09/16/2019     Influenza Vaccine IM > 6 months Valent IIV4 10/17/2018       Current Outpatient Medications   Medication Sig     amLODIPine (NORVASC) 2.5 MG tablet Take 1 tablet (2.5 mg) by mouth daily     calcium carbonate 600 mg-vitamin D 400 units (CALTRATE) 600-400 MG-UNIT per tablet Take 1 tablet by mouth 2 times daily     ferrous gluconate (FERGON) 324 (38 Fe) MG tablet TAKE ONE TABLET BY MOUTH TWICE A DAY     FLUoxetine (PROZAC) 10 MG capsule TAKE ONE CAPSULE BY MOUTH EVERY MORNING.     losartan (COZAAR) 50 MG tablet Take 1 tablet (50 mg) by mouth 2 times daily     Multiple Vitamin (MULTI-VITAMINS) TABS Take 1 tablet by mouth daily     vitamin C (ASCORBIC ACID) 100 MG tablet Take 1,000 mg by mouth daily      Ferrous Gluconate 324 (37.5 Fe) MG TABS      No current facility-administered medications for this visit.         Review of Systems:  I have done 10 points of review systems and all negative except for those mentioned in HPI    Physical examination  Constitutional: Oriented, not in distress  Head and neck: normal posture and movements  Respiratory:  Normal tidal breathing, no shortness of breath, no audible wheezing or stridor over the phone or video visit  Neurological: Alert, orientedx3, no motor deficits  Psychiatric:  Mood and affect are appropriate with insight into his/her medical condition    Data:  Lab Results   Component Value Date    WBC 4.5 01/22/2020     Lab Results   Component Value Date    RBC 3.65 01/22/2020     Lab Results   Component Value Date    HGB 12.1 03/29/2021     Lab Results   Component Value Date    HCT 35.2 01/22/2020     Lab Results   Component Value Date    MCV 96 01/22/2020     Lab Results   Component Value Date    MCH 32.1 01/22/2020     Lab Results   Component Value Date    MCHC 33.2 01/22/2020     Lab Results   Component Value Date    RDW 11.3 01/22/2020     Lab Results   Component Value Date     01/22/2020       Lab Results   Component Value Date     03/29/2021      Lab Results   Component Value Date    POTASSIUM 3.7 03/29/2021     Lab Results   Component Value Date    CHLORIDE 100 03/29/2021     Lab Results   Component Value Date    RADHA 9.1 03/29/2021     Lab Results   Component Value Date    CO2 29 03/29/2021     Lab Results   Component Value Date    BUN 14 03/29/2021     Lab Results   Component Value Date    CR 0.76 03/29/2021     Lab Results   Component Value Date    GLC 91 03/29/2021         INDERJIT Poole MD

## 2021-05-19 NOTE — LETTER
"5/19/2021       RE: Mery Sullivan  [address] [facility] [id number]     Dear Colleague,    Thank you for referring your patient, Mery Sullivan, to the [facility] CANCER CLINIC at [facility]. Please see a copy of my visit note below.    Mery is a 65 year old who is being evaluated via a billable video visit.      How would you like to obtain your AVS? MyChart  If the video visit is dropped, the invitation should be resent by: Send to e-mail at: [email or web address]  Will anyone else be joining your video visit? No    Vitals - Patient Reported  Weight (Patient Reported): 65.8 kg (145 lb)  Height (Patient Reported): 160 cm (5' 3\")  BMI (Based on Pt Reported Ht/Wt): 25.69  Pain Score: No Pain (0)      Video-Visit Details    Type of service:  Video Visit    Video Time: 8 minutes    Originating Location (pt. Location): Home    Distant Location (provider location):  [facility] CANCER [facility]     Platform used for Video Visit: Warner Kapadia MA      [facility]  Interventional Pulmonary Clinic Virtual Visit Note    May 19, 2021    Chief complaint:  Mery Sullivan is a 65 year old female seen for   Chief Complaint   Patient presents with     Video Visit     NEW; mantoux; positive, treated, calcified granuloma of lung       Reason for clinic visit / Chief complaint:   Pulmonary nodule    Assessment and Plan:  Pulmonary nodule in the left lower lobe, measuring 6 mm, calcified likely representing old granuloma.  There is no other nodules or lymph nodes on recent CT scan from 5/14/2021.  We discussed possible etiologies.  I assured her that this is a benign nodule and requires no further follow-up.  History of TB exposure from a patient 40 years ago, positive mental test.  She was treated with a single medication for a year.  Small pericardial effusion I will defer further work-up by her primary care provider.      Billing: Based " on Medical Decision Making (Complexity):  L3    History of Present Illness:  This is a 65 years old woman recently found to have a pulmonary nodule after she had a chest x-ray in March 2021 revealing calcified granuloma and a previous chest x-ray revealing normal lung parenchyma in 2018.  She underwent CT chest in May 2021 with the above findings.  She has history of TB exposure and treatment as above.  She has no respiratory symptoms.  No history of smoking.  She was referred to our clinic for further evaluation and review of her CT chest.       Allergies   Allergen Reactions     Atenolol      Cold hands and feet     Atorvastatin Other (See Comments)     Elevated liver enzymes      Dyazide [Hydrochlorothiazide W-Triamterene]      Caused cramps     Lisinopril      Worked for blood pressure but over time caused chest discomfort     Sulfa Drugs Rash     fever        Past Medical History:   Diagnosis Date     Anemia, unspecified      Cancer (H) Maternal grandmother     Unspecified essential hypertension         Past Surgical History:   Procedure Laterality Date     ORTHOPEDIC SURGERY  Right upper arm        Social History     Socioeconomic History     Marital status:      Spouse name: Not on file     Number of children: Not on file     Years of education: Not on file     Highest education level: Not on file   Occupational History     Not on file   Social Needs     Financial resource strain: Not on file     Food insecurity     Worry: Not on file     Inability: Not on file     Transportation needs     Medical: Not on file     Non-medical: Not on file   Tobacco Use     Smoking status: Never Smoker     Smokeless tobacco: Never Used   Substance and Sexual Activity     Alcohol use: Yes     Comment: occ     Drug use: No     Sexual activity: Yes     Partners: Male     Birth control/protection: Post-menopausal   Lifestyle     Physical activity     Days per week: Not on file     Minutes per session: Not on file      Stress: Not on file   Relationships     Social connections     Talks on phone: Not on file     Gets together: Not on file     Attends Christianity service: Not on file     Active member of club or organization: Not on file     Attends meetings of clubs or organizations: Not on file     Relationship status: Not on file     Intimate partner violence     Fear of current or ex partner: Not on file     Emotionally abused: Not on file     Physically abused: Not on file     Forced sexual activity: Not on file   Other Topics Concern     Parent/sibling w/ CABG, MI or angioplasty before 65F 55M? 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        Family History   Problem Relation Age of Onset     Heart Disease Father         CHF     Hypertension Father      Cerebrovascular Disease Mother      Osteoporosis Mother      Hypertension Mother      Cancer - colorectal Maternal Grandmother      Allergies Sister      Obesity Sister      Obesity Brother         Immunization History   Administered Date(s) Administered     COVID-19,PF,Pfizer 03/05/2021, 03/26/2021     DTaP, Unspecified 07/10/2015     Influenza Quad, Recombinant, p-free (RIV4) 09/16/2019     Influenza Vaccine IM > 6 months Valent IIV4 10/17/2018       Current Outpatient Medications   Medication Sig     amLODIPine (NORVASC) 2.5 MG tablet Take 1 tablet (2.5 mg) by mouth daily     calcium carbonate 600 mg-vitamin D 400 units (CALTRATE) 600-400 MG-UNIT per tablet Take 1 tablet by mouth 2 times daily     ferrous gluconate (FERGON) 324 (38 Fe) MG tablet TAKE ONE TABLET BY MOUTH TWICE A DAY     FLUoxetine (PROZAC) 10 MG capsule TAKE ONE CAPSULE BY MOUTH EVERY MORNING.     losartan (COZAAR) 50 MG tablet Take 1 tablet (50 mg) by mouth 2 times daily     Multiple Vitamin (MULTI-VITAMINS) TABS Take 1 tablet by mouth daily     vitamin C (ASCORBIC ACID) 100 MG tablet Take 1,000 mg by mouth daily      Ferrous Gluconate 324 (37.5 Fe) MG TABS       No current facility-administered medications for this visit.         Review of Systems:  I have done 10 points of review systems and all negative except for those mentioned in HPI    Physical examination  Constitutional: Oriented, not in distress  Head and neck: normal posture and movements  Respiratory: Normal tidal breathing, no shortness of breath, no audible wheezing or stridor over the phone or video visit  Neurological: Alert, orientedx3, no motor deficits  Psychiatric:  Mood and affect are appropriate with insight into his/her medical condition    Data:  Lab Results   Component Value Date    WBC 4.5 01/22/2020     Lab Results   Component Value Date    RBC 3.65 01/22/2020     Lab Results   Component Value Date    HGB 12.1 03/29/2021     Lab Results   Component Value Date    HCT 35.2 01/22/2020     Lab Results   Component Value Date    MCV 96 01/22/2020     Lab Results   Component Value Date    MCH 32.1 01/22/2020     Lab Results   Component Value Date    MCHC 33.2 01/22/2020     Lab Results   Component Value Date    RDW 11.3 01/22/2020     Lab Results   Component Value Date     01/22/2020       Lab Results   Component Value Date     03/29/2021      Lab Results   Component Value Date    POTASSIUM 3.7 03/29/2021     Lab Results   Component Value Date    CHLORIDE 100 03/29/2021     Lab Results   Component Value Date    RADHA 9.1 03/29/2021     Lab Results   Component Value Date    CO2 29 03/29/2021     Lab Results   Component Value Date    BUN 14 03/29/2021     Lab Results   Component Value Date    CR 0.76 03/29/2021     Lab Results   Component Value Date    GLC 91 03/29/2021         INDERJIT Poole MD        Again, thank you for allowing me to participate in the care of your patient.      Sincerely,    Rohit Poole MD

## 2021-05-21 ENCOUNTER — OFFICE VISIT (OUTPATIENT)
Dept: AUDIOLOGY | Facility: CLINIC | Age: 66
End: 2021-05-21
Payer: COMMERCIAL

## 2021-05-21 DIAGNOSIS — H90.72 MIXED CONDUCTIVE AND SENSORINEURAL HEARING LOSS OF LEFT EAR WITH UNRESTRICTED HEARING OF RIGHT EAR: Primary | ICD-10-CM

## 2021-05-21 PROCEDURE — 99207 PR NO CHARGE LOS: CPT | Performed by: AUDIOLOGIST

## 2021-05-21 PROCEDURE — 92557 COMPREHENSIVE HEARING TEST: CPT | Performed by: AUDIOLOGIST

## 2021-05-21 PROCEDURE — 92550 TYMPANOMETRY & REFLEX THRESH: CPT | Mod: 52 | Performed by: AUDIOLOGIST

## 2021-05-21 NOTE — PROGRESS NOTES
AUDIOLOGY REPORT - Hearing Test    SUBJECTIVE:  Mery Sullivan is a 65 year old female who was seen in the Audiology Clinic at the [facility] for audiologic evaluation, referred by KAYE Echevarria PA-C, for concern regarding gradual decrease in hearing, left ear may be worse. Denied tinnitus, vertigo,and falls. She did report left TM rupture during a flight. [place], some noise exposure. They were unaccompanied today..    OBJECTIVE:  Abuse Screening:  Do you feel unsafe at home or work/school? No  Do you feel threatened by someone? No  Does anyone try to keep you from having contact with others, or doing things outside of your home? No  Physical signs of abuse present? No     Fall Risk Screen:  1. Have you fallen two or more times in the past year? No  2. Have you fallen and had an injury in the past year? No    Timed Up and Go Score (in seconds): not tested    Otoscopic exam indicates cerumen in the right ear and clear canal and visible landmarks left ear.     Pure Tone Thresholds assessed using conventional audiometry with good  reliability from 250-8000 Hz bilaterally using circumaural headphones     RIGHT:  normal sloping to borderline-normal hearing sensitivity    LEFT:    mild rising to normal sloping to mild mixed hearing loss    Tympanogram:    RIGHT: normal eardrum mobility    LEFT:   Excessive eardrum mobility    Reflexes (reported by stimulus ear):  RIGHT: Ipsilateral is present at normal levels  RIGHT: Contralateral is could not test too much artifact  LEFT:   Ipsilateral is could not test too much artifact   LEFT:   Contralateral is present at normal levels    Speech Reception Threshold:    RIGHT: 10 dB HL    LEFT:   15 dB HL    Word Recognition Score:     RIGHT: 100% at 50 dB HL using NU-6 recorded word list.    LEFT:   100% at 55 dB HL using NU-6 recorded word list.      ASSESSMENT:     ICD-10-CM    1. Mixed conductive and sensorineural hearing loss of left ear with  unrestricted hearing of right ear  [facility] Speech Recog ([id number])     Reduced 52 - Tymps / Reflex   ([id number])      Today s results were discussed with the patient in detail.     PLAN:    It is recommended that the patient schedule an ENT consultation for ear cleaning and evaluation of mixed hearing loss left ear.  Please call this clinic with questions regarding these results or recommendations.        Kay Dutta Licensed Audiologist #[id number]

## 2021-06-09 NOTE — PROGRESS NOTES
Outpatient Physical Therapy Discharge Note     Patient: Mery Sullivan  : 1955    Beginning/End Dates of Reporting Period:  21 to 21    Referring Provider: Melissa Ely PA-C    Therapy Diagnosis: L hip OA    Patient did not return for follow up treatments as directed.  Goal status and current objective information is therefore unknown.  Discharge from PT services at this time for this episode of treatment. Please see attached documentation under this episode of care for further information including dates of service, start of care date, referring physician, Dx, treatment plan, treatments, etc.    Please contact me with any questions or concerns.    Thank you for your referral.    Andie Pradhan, PT, DPT  Physical Therapist  [address] [address] [phone]

## 2021-06-18 NOTE — PROGRESS NOTES
Chief Complaint   Patient presents with     Cerumen Impaction     needs ears cleaned and told by audiologist something going on with left middle ear     History of Present Illness   Mery Sullivan is a 65 year old female who presents to me today for ear evaluation.  The patient was referred for audiometric assessment secondary to gradual hearing decline may be slightly worse on the left-hand side.  She was seen by one of our audiologist who noticed nonoccluding right-sided impacted cerumen.     The patient underwent an audiogram performed 5/21/2021.  My review of the audiogram showed normal sloping to mild high-frequency sensorineural hearing loss in the right ear and mild sloping to normal sloping to mild mixed hearing loss in the left ear.  Pure-tone average is 15 dB on the right and 20 dB on the left.  Speech reception threshhold is 10 dB on the right and 15 dB on the left.  The patient had 100% word recognition on the right and 100% word recognition on the left.  The patient had a type A tympanogram on the right and a type A deep tympanogram on the left.     From a symptom standpoint, the patient reports  no problems with otalgia or otorrhea.  No bloody otorrhea.  She does have some intermittent tinnitus.  No significant dizziness or vertigo.  No prior history of ear surgery.  No significant noise exposure history aside from growing up on a farm.  He did have her tympanic membrane rupture on a flight, she cannot recall if it was her right or left ear.  She also had issues with ear infections and perforated otitis media when she was younger.    Past Medical History  Patient Active Problem List   Diagnosis     Essential hypertension, benign     Dysthymic disorder     Generalized anxiety disorder     Hyperlipidemia     Mantoux: positive, treated     Near syncope     Calcified granuloma of lung (H)     Current Medications    Current Outpatient Medications:      amLODIPine (NORVASC) 2.5 MG tablet, Take 1 tablet (2.5  mg) by mouth daily, Disp: 90 tablet, Rfl: 3     calcium carbonate 600 mg-vitamin D 400 units (CALTRATE) 600-400 MG-UNIT per tablet, Take 1 tablet by mouth 2 times daily, Disp: , Rfl:      ferrous gluconate (FERGON) 324 (38 Fe) MG tablet, TAKE ONE TABLET BY MOUTH TWICE A DAY, Disp: 180 tablet, Rfl: 1     FLUoxetine (PROZAC) 10 MG capsule, TAKE ONE CAPSULE BY MOUTH EVERY MORNING., Disp: 90 capsule, Rfl: 3     losartan (COZAAR) 50 MG tablet, Take 1 tablet (50 mg) by mouth 2 times daily, Disp: 180 tablet, Rfl: 3     Multiple Vitamin (MULTI-VITAMINS) TABS, Take 1 tablet by mouth daily, Disp: , Rfl:      vitamin C (ASCORBIC ACID) 100 MG tablet, Take 1,000 mg by mouth daily , Disp: , Rfl:     Allergies  Allergies   Allergen Reactions     Atenolol      Cold hands and feet     Atorvastatin Other (See Comments)     Elevated liver enzymes      Dyazide [Hydrochlorothiazide W-Triamterene]      Caused cramps     Lisinopril      Worked for blood pressure but over time caused chest discomfort     Sulfa Drugs Rash     fever       Social History  Social History     Socioeconomic History     Marital status:      Spouse name: Not on file     Number of children: Not on file     Years of education: Not on file     Highest education level: Not on file   Occupational History     Not on file   Social Needs     Financial resource strain: Not on file     Food insecurity     Worry: Not on file     Inability: Not on file     Transportation needs     Medical: Not on file     Non-medical: Not on file   Tobacco Use     Smoking status: Never Smoker     Smokeless tobacco: Never Used   Substance and Sexual Activity     Alcohol use: Yes     Comment: occ     Drug use: No     Sexual activity: Yes     Partners: Male     Birth control/protection: Post-menopausal   Lifestyle     Physical activity     Days per week: Not on file     Minutes per session: Not on file     Stress: Not on file   Relationships     Social connections     Talks on phone: Not  on file     Gets together: Not on file     Attends Jehovah's witness service: Not on file     Active member of club or organization: Not on file     Attends meetings of clubs or organizations: Not on file     Relationship status: Not on file     Intimate partner violence     Fear of current or ex partner: Not on file     Emotionally abused: Not on file     Physically abused: Not on file     Forced sexual activity: Not on file   Other Topics Concern     Parent/sibling w/ CABG, MI or angioplasty before 65F 55M? 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       Family History  Family History   Problem Relation Age of Onset     Heart Disease Father         CHF     Hypertension Father      Cerebrovascular Disease Mother      Osteoporosis Mother      Hypertension Mother      Cancer - colorectal Maternal Grandmother      Allergies Sister      Obesity Sister      Obesity Brother        Review of Systems  As per HPI and PMHx, otherwise 7 system review of the head and neck negative.    Physical Exam  /67 (BP Location: Left arm, Patient Position: Chair, Cuff Size: Adult Regular)   Pulse 75   Temp 98.2  F (36.8  C) (Tympanic)   Wt 65.8 kg (145 lb)   LMP 06/16/2006   BMI 25.88 kg/m    GENERAL: Patient is a pleasant, cooperative 65 year old female in no acute distress.  HEAD: Normocephalic, atraumatic.  Hair and scalp are normal.  EYES: Pupils are equal, round, reactive to light and accommodation.  Extraocular movements are intact.  The sclera nonicteric without injection.  The extraocular structures are normal.  EARS: See below.  NEUROLOGIC: Cranial nerves II through XII are grossly intact.  Voice is strong.  Facial nerve examination incomplete due to the patient wearing a mask.  CARDIOVASCULAR: Extremities are warm and well-perfused.  No significant peripheral edema.  RESPIRATORY: Patient has nonlabored breathing without cough, wheeze, stridor.  PSYCHIATRIC: Patient is alert  and oriented.  Mood and affect appear normal.  SKIN: Warm and dry.  No scalp, face, or neck lesions noted.    Physical Exam and Procedure    EARS: Normal shape and symmetry.  No tenderness when palpating the mastoid or tragal areas bilaterally.  The ears were then examined under the otic binocular microscope to perform cerumen removal.  Otoscopic exam on the right reveals impacted cerumen down to the level of the tympanic membrane.  The cerumen was removed with alligator forceps.  The right tympanic membrane was round, intact without evidence of effusion.  No retraction, granulation, drainage, or evidence of cholesteatoma.      Attention was turned to the left ear.  Otoscopic exam on the left reveals a clear canal.  The left tympanic membrane was round, intact without evidence of effusion.  There is some monomeric areas of drum.  The malleus appears in normal position.  No retraction pockets, granulation, drainage, or evidence of cholesteatoma.      Assessment and Plan     ICD-10-CM    1. Sensorineural hearing loss (SNHL) of right ear with restricted hearing of left ear  H90.A21    2. Mixed conductive and sensorineural hearing loss of left ear with restricted hearing of right ear  H90.A32    3. Impacted cerumen of right ear  H61.21 Remove Cerumen     It was my pleasure seeing Mery Sullivan today in clinic.  The patient presents today with bilateral hearing loss which is primarily sensorineural but may be a slight mixed component of the left ear at higher tones.  This is most consistent with presbycusis B previous tympanic membrane perforation or infection. I can find no evidence of serious CNS disorders or other complicating factors that could be causing this.  We spent the remainder of today's visit on education. We discussed hearing protection in noisy environments.     We had a long discussion that a hearing aid may not help either ear given how close to normal her hearing is.  She seems very interested and  motivated and at least trying a hearing aid for the left ear.  I said that she could always speak with our audiologist to discuss further.  The patient is medically cleared for consideration of a hearing aid evaluation.     The patient will follow up as necessary for worsening symptoms or changes in symptoms. I have also recommended repeat audiogram in 1-3 years, or sooner if symptoms warrant.       Júnior Stuart MD  Department of Otolarygology-Head and Neck Surgery  [facility]

## 2021-06-21 ENCOUNTER — OFFICE VISIT (OUTPATIENT)
Dept: OTOLARYNGOLOGY | Facility: CLINIC | Age: 66
End: 2021-06-21
Payer: COMMERCIAL

## 2021-06-21 VITALS
TEMPERATURE: 98.2 F | WEIGHT: 145 LBS | DIASTOLIC BLOOD PRESSURE: 67 MMHG | SYSTOLIC BLOOD PRESSURE: 125 MMHG | HEART RATE: 75 BPM | BODY MASS INDEX: 25.88 KG/M2

## 2021-06-21 DIAGNOSIS — H90.A21 SENSORINEURAL HEARING LOSS (SNHL) OF RIGHT EAR WITH RESTRICTED HEARING OF LEFT EAR: Primary | ICD-10-CM

## 2021-06-21 DIAGNOSIS — H90.A32 MIXED CONDUCTIVE AND SENSORINEURAL HEARING LOSS OF LEFT EAR WITH RESTRICTED HEARING OF RIGHT EAR: ICD-10-CM

## 2021-06-21 DIAGNOSIS — H61.21 IMPACTED CERUMEN OF RIGHT EAR: ICD-10-CM

## 2021-06-21 PROCEDURE — 69210 REMOVE IMPACTED EAR WAX UNI: CPT | Performed by: OTOLARYNGOLOGY

## 2021-06-21 PROCEDURE — 99203 OFFICE O/P NEW LOW 30 MIN: CPT | Mod: 25 | Performed by: OTOLARYNGOLOGY

## 2021-06-21 ASSESSMENT — PAIN SCALES - GENERAL: PAINLEVEL: NO PAIN (0)

## 2021-06-21 NOTE — LETTER
6/21/2021         RE: Mery Sullivan  [id number] [facility] [id number]        Dear Colleague,    Thank you for referring your patient, Mery Sullivan, to the [facility]. Please see a copy of my visit note below.    Chief Complaint   Patient presents with     Cerumen Impaction     needs ears cleaned and told by audiologist something going on with left middle ear     History of Present Illness   Mery Sullivan is a 65 year old female who presents to me today for ear evaluation.  The patient was referred for audiometric assessment secondary to gradual hearing decline may be slightly worse on the left-hand side.  She was seen by one of our audiologist who noticed nonoccluding right-sided impacted cerumen.     The patient underwent an audiogram performed 5/21/2021.  My review of the audiogram showed normal sloping to mild high-frequency sensorineural hearing loss in the right ear and mild sloping to normal sloping to mild mixed hearing loss in the left ear.  Pure-tone average is 15 dB on the right and 20 dB on the left.  Speech reception threshhold is 10 dB on the right and 15 dB on the left.  The patient had 100% word recognition on the right and 100% word recognition on the left.  The patient had a type A tympanogram on the right and a type A deep tympanogram on the left.     From a symptom standpoint, the patient reports  no problems with otalgia or otorrhea.  No bloody otorrhea.  She does have some intermittent tinnitus.  No significant dizziness or vertigo.  No prior history of ear surgery.  No significant noise exposure history aside [place]  He did have her tympanic membrane rupture on a flight, she cannot recall if it was her right or left ear.  She also had issues with ear infections and perforated otitis media when she was younger.    Past Medical History  Patient Active Problem List   Diagnosis     Essential hypertension, benign     Dysthymic disorder      Generalized anxiety disorder     Hyperlipidemia     Mantoux: positive, treated     Near syncope     Calcified granuloma of lung (H)     Current Medications    Current Outpatient Medications:      amLODIPine (NORVASC) 2.5 MG tablet, Take 1 tablet (2.5 mg) by mouth daily, Disp: 90 tablet, Rfl: 3     calcium carbonate 600 mg-vitamin D 400 units (CALTRATE) 600-400 MG-UNIT per tablet, Take 1 tablet by mouth 2 times daily, Disp: , Rfl:      ferrous gluconate (FERGON) 324 (38 Fe) MG tablet, TAKE ONE TABLET BY MOUTH TWICE A DAY, Disp: 180 tablet, Rfl: 1     FLUoxetine (PROZAC) 10 MG capsule, TAKE ONE CAPSULE BY MOUTH EVERY MORNING., Disp: 90 capsule, Rfl: 3     losartan (COZAAR) 50 MG tablet, Take 1 tablet (50 mg) by mouth 2 times daily, Disp: 180 tablet, Rfl: 3     Multiple Vitamin (MULTI-VITAMINS) TABS, Take 1 tablet by mouth daily, Disp: , Rfl:      vitamin C (ASCORBIC ACID) 100 MG tablet, Take 1,000 mg by mouth daily , Disp: , Rfl:     Allergies  Allergies   Allergen Reactions     Atenolol      Cold hands and feet     Atorvastatin Other (See Comments)     Elevated liver enzymes      Dyazide [Hydrochlorothiazide W-Triamterene]      Caused cramps     Lisinopril      Worked for blood pressure but over time caused chest discomfort     Sulfa Drugs Rash     fever       Social History  Social History     Socioeconomic History     Marital status:      Spouse name: Not on file     Number of children: Not on file     Years of education: Not on file     Highest education level: Not on file   Occupational History     Not on file   Social Needs     Financial resource strain: Not on file     Food insecurity     Worry: Not on file     Inability: Not on file     Transportation needs     Medical: Not on file     Non-medical: Not on file   Tobacco Use     Smoking status: Never Smoker     Smokeless tobacco: Never Used   Substance and Sexual Activity     Alcohol use: Yes     Comment: occ     Drug use: No     Sexual activity: Yes      Partners: Male     Birth control/protection: Post-menopausal   Lifestyle     Physical activity     Days per week: Not on file     Minutes per session: Not on file     Stress: Not on file   Relationships     Social connections     Talks on phone: Not on file     Gets together: Not on file     Attends Sikhism service: Not on file     Active member of club or organization: Not on file     Attends meetings of clubs or organizations: Not on file     Relationship status: Not on file     Intimate partner violence     Fear of current or ex partner: Not on file     Emotionally abused: Not on file     Physically abused: Not on file     Forced sexual activity: Not on file   Other Topics Concern     Parent/sibling w/ CABG, MI or angioplasty before 65F 55M? 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       Family History  Family History   Problem Relation Age of Onset     Heart Disease Father         CHF     Hypertension Father      Cerebrovascular Disease Mother      Osteoporosis Mother      Hypertension Mother      Cancer - colorectal Maternal Grandmother      Allergies Sister      Obesity Sister      Obesity Brother        Review of Systems  As per HPI and PMHx, otherwise 7 system review of the head and neck negative.    Physical Exam  /67 (BP Location: Left arm, Patient Position: Chair, Cuff Size: Adult Regular)   Pulse 75   Temp 98.2  F (36.8  C) (Tympanic)   Wt 65.8 kg (145 lb)   LMP 06/16/2006   BMI 25.88 kg/m    GENERAL: Patient is a pleasant, cooperative 65 year old female in no acute distress.  HEAD: Normocephalic, atraumatic.  Hair and scalp are normal.  EYES: Pupils are equal, round, reactive to light and accommodation.  Extraocular movements are intact.  The sclera nonicteric without injection.  The extraocular structures are normal.  EARS: See below.  NEUROLOGIC: Cranial nerves II through XII are grossly intact.  Voice is strong.  Facial nerve  examination incomplete due to the patient wearing a mask.  CARDIOVASCULAR: Extremities are warm and well-perfused.  No significant peripheral edema.  RESPIRATORY: Patient has nonlabored breathing without cough, wheeze, stridor.  PSYCHIATRIC: Patient is alert and oriented.  Mood and affect appear normal.  SKIN: Warm and dry.  No scalp, face, or neck lesions noted.    Physical Exam and Procedure    EARS: Normal shape and symmetry.  No tenderness when palpating the mastoid or tragal areas bilaterally.  The ears were then examined under the otic binocular microscope to perform cerumen removal.  Otoscopic exam on the right reveals impacted cerumen down to the level of the tympanic membrane.  The cerumen was removed with alligator forceps.  The right tympanic membrane was round, intact without evidence of effusion.  No retraction, granulation, drainage, or evidence of cholesteatoma.      Attention was turned to the left ear.  Otoscopic exam on the left reveals a clear canal.  The left tympanic membrane was round, intact without evidence of effusion.  There is some monomeric areas of drum.  The malleus appears in normal position.  No retraction pockets, granulation, drainage, or evidence of cholesteatoma.      Assessment and Plan     ICD-10-CM    1. Sensorineural hearing loss (SNHL) of right ear with restricted hearing of left ear  H90.A21    2. Mixed conductive and sensorineural hearing loss of left ear with restricted hearing of right ear  H90.A32    3. Impacted cerumen of right ear  H61.21 Remove Cerumen     It was my pleasure seeing Mery Sullivan today in clinic.  The patient presents today with bilateral hearing loss which is primarily sensorineural but may be a slight mixed component of the left ear at higher tones.  This is most consistent with presbycusis B previous tympanic membrane perforation or infection. I can find no evidence of serious CNS disorders or other complicating factors that could be causing this.   We spent the remainder of today's visit on education. We discussed hearing protection in noisy environments.     We had a long discussion that a hearing aid may not help either ear given how close to normal her hearing is.  She seems very interested and motivated and at least trying a hearing aid for the left ear.  I said that she could always speak with our audiologist to discuss further.  The patient is medically cleared for consideration of a hearing aid evaluation.     The patient will follow up as necessary for worsening symptoms or changes in symptoms. I have also recommended repeat audiogram in 1-3 years, or sooner if symptoms warrant.       Júnior Stuart MD  Department of Otolarygology-Head and Neck Surgery  [facility]           Again, thank you for allowing me to participate in the care of your patient.        Sincerely,        Júnior Stuart MD

## 2021-06-21 NOTE — NURSING NOTE
"Initial /67 (BP Location: Left arm, Patient Position: Chair, Cuff Size: Adult Regular)   Pulse 75   Temp 98.2  F (36.8  C) (Tympanic)   Wt 65.8 kg (145 lb)   LMP 06/16/2006   BMI 25.88 kg/m   Estimated body mass index is 25.88 kg/m  as calculated from the following:    Height as of 3/29/21: 1.594 m (5' 2.76\").    Weight as of this encounter: 65.8 kg (145 lb). .    Makenzie Aparicio LPN    "

## 2021-09-12 ENCOUNTER — HEALTH MAINTENANCE LETTER (OUTPATIENT)
Age: 66
End: 2021-09-12

## 2021-11-12 ENCOUNTER — OFFICE VISIT (OUTPATIENT)
Dept: URGENT CARE | Facility: URGENT CARE | Age: 66
End: 2021-11-12
Payer: COMMERCIAL

## 2021-11-12 VITALS
HEART RATE: 75 BPM | SYSTOLIC BLOOD PRESSURE: 140 MMHG | TEMPERATURE: 97.7 F | BODY MASS INDEX: 26.42 KG/M2 | OXYGEN SATURATION: 98 % | RESPIRATION RATE: 16 BRPM | DIASTOLIC BLOOD PRESSURE: 82 MMHG | WEIGHT: 148 LBS

## 2021-11-12 DIAGNOSIS — J01.90 ACUTE SINUSITIS WITH SYMPTOMS > 10 DAYS: Primary | ICD-10-CM

## 2021-11-12 DIAGNOSIS — I10 ESSENTIAL HYPERTENSION, BENIGN: ICD-10-CM

## 2021-11-12 DIAGNOSIS — R07.0 THROAT PAIN: ICD-10-CM

## 2021-11-12 LAB
DEPRECATED S PYO AG THROAT QL EIA: NEGATIVE
FLUAV AG SPEC QL IA: NEGATIVE
FLUBV AG SPEC QL IA: NEGATIVE
SARS-COV-2 RNA RESP QL NAA+PROBE: NEGATIVE

## 2021-11-12 PROCEDURE — U0003 INFECTIOUS AGENT DETECTION BY NUCLEIC ACID (DNA OR RNA); SEVERE ACUTE RESPIRATORY SYNDROME CORONAVIRUS 2 (SARS-COV-2) (CORONAVIRUS DISEASE [COVID-19]), AMPLIFIED PROBE TECHNIQUE, MAKING USE OF HIGH THROUGHPUT TECHNOLOGIES AS DESCRIBED BY CMS-2020-01-R: HCPCS | Performed by: PHYSICIAN ASSISTANT

## 2021-11-12 PROCEDURE — 87651 STREP A DNA AMP PROBE: CPT | Performed by: PHYSICIAN ASSISTANT

## 2021-11-12 PROCEDURE — 99213 OFFICE O/P EST LOW 20 MIN: CPT | Performed by: PHYSICIAN ASSISTANT

## 2021-11-12 PROCEDURE — U0005 INFEC AGEN DETEC AMPLI PROBE: HCPCS | Performed by: PHYSICIAN ASSISTANT

## 2021-11-12 PROCEDURE — 87804 INFLUENZA ASSAY W/OPTIC: CPT | Performed by: PHYSICIAN ASSISTANT

## 2021-11-13 LAB — GROUP A STREP BY PCR: NOT DETECTED

## 2021-11-13 NOTE — PROGRESS NOTES
"  Assessment & Plan     Acute sinusitis with symptoms > 10 days  Will treat with amoxicillin-clavulanate (AUGMENTIN) 875-125 MG tablet; Take 1 tablet by mouth 2 times daily for 10 days. Continue with supportive care. Return to clinic if symptoms worsen or do not improve; otherwise follow up as needed      Throat pain    - Symptomatic COVID-19 Virus (Coronavirus) by PCR Nose  - Streptococcus A Rapid Screen w/Reflex to PCR - Clinic Collect  - Influenza A/B antigen  - Group A Streptococcus PCR Throat Swab    Essential hypertension, benign  Monitor blood pressure at home and if average pressure is greater than or equal to 140/90 follow up with primary care provider                 BMI:   Estimated body mass index is 26.42 kg/m  as calculated from the following:    Height as of 3/29/21: 1.594 m (5' 2.76\").    Weight as of this encounter: 67.1 kg (148 lb).           Return in about 1 week (around 11/19/2021), or if symptoms worsen or fail to improve.    Alecia Puentes PA-C  [facility]            Subjective   Chief Complaint   Patient presents with     Sinus Problem     Sinus drainage, headache, ear pain, sinus pressure, pain in teeth - beginning a week ago.       HPI     URI Adult    Onset of symptoms was 1+ week(s) ago.  Course of illness is same.    Severity moderate  Current and Associated symptoms: sinus drainage, facial pain  Treatment measures tried include antihistamine, decongestant.  Predisposing factors include None.              Review of Systems   Constitutional, HEENT, cardiovascular, pulmonary, gi and gu systems are negative, except as otherwise noted.      Objective    BP (!) 140/82 (BP Location: Right arm, Patient Position: Sitting, Cuff Size: Adult Regular)   Pulse 75   Temp 97.7  F (36.5  C) (Tympanic)   Resp 16   Wt 67.1 kg (148 lb)   LMP 06/16/2006   SpO2 98%   BMI 26.42 kg/m    Body mass index is 26.42 kg/m .     Physical Exam  Constitutional:       Appearance: " She is well-developed.   HENT:      Head: Normocephalic.      Right Ear: Tympanic membrane and ear canal normal.      Left Ear: Tympanic membrane and ear canal normal.   Eyes:      Conjunctiva/sclera: Conjunctivae normal.   Cardiovascular:      Rate and Rhythm: Normal rate.      Heart sounds: Normal heart sounds.   Pulmonary:      Effort: Pulmonary effort is normal.      Breath sounds: Normal breath sounds.   Skin:     General: Skin is warm and dry.      Findings: No rash.   Psychiatric:         Behavior: Behavior normal.

## 2021-11-14 NOTE — PATIENT INSTRUCTIONS
Monitor blood pressure at home and if average pressure is greater than or equal to 140/90 follow up with primary care provider

## 2022-04-15 ENCOUNTER — TELEPHONE (OUTPATIENT)
Dept: FAMILY MEDICINE | Facility: CLINIC | Age: 67
End: 2022-04-15
Payer: COMMERCIAL

## 2022-04-15 NOTE — TELEPHONE ENCOUNTER
Patient Quality Outreach    Patient is due for the following:   Hypertension -  Hypertension follow-up visit  Depression  -  PHQ-9 Needed    NEXT STEPS:   Schedule a office visit for hypertension    Type of outreach:    Sent [employer] message.      Questions for provider review:    None     Winnie Perez CMA

## 2022-05-04 ENCOUNTER — TELEPHONE (OUTPATIENT)
Dept: FAMILY MEDICINE | Facility: CLINIC | Age: 67
End: 2022-05-04
Payer: COMMERCIAL

## 2022-05-04 DIAGNOSIS — I10 BENIGN ESSENTIAL HYPERTENSION: ICD-10-CM

## 2022-05-04 RX ORDER — LOSARTAN POTASSIUM 50 MG/1
TABLET ORAL
Qty: 180 TABLET | Refills: 0 | Status: SHIPPED | OUTPATIENT
Start: 2022-05-04 | End: 2022-05-09

## 2022-05-04 RX ORDER — AMLODIPINE BESYLATE 2.5 MG/1
2.5 TABLET ORAL DAILY
Qty: 90 TABLET | Refills: 0 | Status: SHIPPED | OUTPATIENT
Start: 2022-05-04 | End: 2022-05-09

## 2022-05-04 NOTE — TELEPHONE ENCOUNTER
Patient called for a refill on her BP meds she will run out before her appt on 5/27/2022.      Lena Venegas, ROSEMARY Suresh

## 2022-05-09 ENCOUNTER — TELEPHONE (OUTPATIENT)
Dept: FAMILY MEDICINE | Facility: CLINIC | Age: 67
End: 2022-05-09
Payer: COMMERCIAL

## 2022-05-09 DIAGNOSIS — F41.9 ANXIETY: ICD-10-CM

## 2022-05-09 DIAGNOSIS — I10 BENIGN ESSENTIAL HYPERTENSION: ICD-10-CM

## 2022-05-09 RX ORDER — FLUOXETINE 10 MG/1
CAPSULE ORAL
Qty: 90 CAPSULE | Refills: 3 | Status: SHIPPED | OUTPATIENT
Start: 2022-05-09 | End: 2022-05-27

## 2022-05-09 RX ORDER — AMLODIPINE BESYLATE 2.5 MG/1
2.5 TABLET ORAL DAILY
Qty: 90 TABLET | Refills: 0 | Status: SHIPPED | OUTPATIENT
Start: 2022-05-09 | End: 2022-05-27

## 2022-05-09 RX ORDER — LOSARTAN POTASSIUM 50 MG/1
50 TABLET ORAL 2 TIMES DAILY
Qty: 180 TABLET | Refills: 0 | Status: SHIPPED | OUTPATIENT
Start: 2022-05-09 | End: 2022-05-27

## 2022-05-27 ENCOUNTER — OFFICE VISIT (OUTPATIENT)
Dept: FAMILY MEDICINE | Facility: CLINIC | Age: 67
End: 2022-05-27
Payer: COMMERCIAL

## 2022-05-27 VITALS
DIASTOLIC BLOOD PRESSURE: 70 MMHG | HEIGHT: 62 IN | TEMPERATURE: 97.8 F | HEART RATE: 79 BPM | RESPIRATION RATE: 16 BRPM | BODY MASS INDEX: 26.28 KG/M2 | WEIGHT: 142.8 LBS | OXYGEN SATURATION: 98 % | SYSTOLIC BLOOD PRESSURE: 118 MMHG

## 2022-05-27 DIAGNOSIS — Z86.39 HISTORY OF IRON DEFICIENCY: ICD-10-CM

## 2022-05-27 DIAGNOSIS — F41.9 ANXIETY: ICD-10-CM

## 2022-05-27 DIAGNOSIS — I10 BENIGN ESSENTIAL HYPERTENSION: ICD-10-CM

## 2022-05-27 DIAGNOSIS — Z23 HIGH PRIORITY FOR 2019-NCOV VACCINE: ICD-10-CM

## 2022-05-27 DIAGNOSIS — J98.4 CALCIFIED GRANULOMA OF LUNG: ICD-10-CM

## 2022-05-27 DIAGNOSIS — R53.83 FATIGUE, UNSPECIFIED TYPE: ICD-10-CM

## 2022-05-27 DIAGNOSIS — E78.5 HYPERLIPIDEMIA, UNSPECIFIED HYPERLIPIDEMIA TYPE: ICD-10-CM

## 2022-05-27 DIAGNOSIS — F43.9 STRESS: ICD-10-CM

## 2022-05-27 DIAGNOSIS — Z00.00 ROUTINE GENERAL MEDICAL EXAMINATION AT A HEALTH CARE FACILITY: Primary | ICD-10-CM

## 2022-05-27 PROBLEM — E61.1 IRON DEFICIENCY: Status: ACTIVE | Noted: 2022-05-27

## 2022-05-27 LAB
CHOLEST SERPL-MCNC: 264 MG/DL
ERYTHROCYTE [DISTWIDTH] IN BLOOD BY AUTOMATED COUNT: 11.7 % (ref 10–15)
FASTING STATUS PATIENT QL REPORTED: YES
HCT VFR BLD AUTO: 34.3 % (ref 35–47)
HDLC SERPL-MCNC: 83 MG/DL
HGB BLD-MCNC: 11.4 G/DL (ref 11.7–15.7)
LDLC SERPL CALC-MCNC: 163 MG/DL
MCH RBC QN AUTO: 32.2 PG (ref 26.5–33)
MCHC RBC AUTO-ENTMCNC: 33.2 G/DL (ref 31.5–36.5)
MCV RBC AUTO: 97 FL (ref 78–100)
NONHDLC SERPL-MCNC: 181 MG/DL
PLATELET # BLD AUTO: 261 10E3/UL (ref 150–450)
RBC # BLD AUTO: 3.54 10E6/UL (ref 3.8–5.2)
TRIGL SERPL-MCNC: 89 MG/DL
WBC # BLD AUTO: 4.3 10E3/UL (ref 4–11)

## 2022-05-27 PROCEDURE — 0054A COVID-19,PF,PFIZER (12+ YRS): CPT | Performed by: PHYSICIAN ASSISTANT

## 2022-05-27 PROCEDURE — 91305 COVID-19,PF,PFIZER (12+ YRS): CPT | Performed by: PHYSICIAN ASSISTANT

## 2022-05-27 PROCEDURE — 99214 OFFICE O/P EST MOD 30 MIN: CPT | Mod: 25 | Performed by: PHYSICIAN ASSISTANT

## 2022-05-27 PROCEDURE — 85027 COMPLETE CBC AUTOMATED: CPT | Performed by: PHYSICIAN ASSISTANT

## 2022-05-27 PROCEDURE — 99396 PREV VISIT EST AGE 40-64: CPT | Mod: 25 | Performed by: PHYSICIAN ASSISTANT

## 2022-05-27 PROCEDURE — 36415 COLL VENOUS BLD VENIPUNCTURE: CPT | Performed by: PHYSICIAN ASSISTANT

## 2022-05-27 PROCEDURE — 80061 LIPID PANEL: CPT | Performed by: PHYSICIAN ASSISTANT

## 2022-05-27 RX ORDER — FLUOXETINE 10 MG/1
10-20 CAPSULE ORAL DAILY
Qty: 180 CAPSULE | Refills: 3 | Status: SHIPPED | OUTPATIENT
Start: 2022-05-27

## 2022-05-27 RX ORDER — LOSARTAN POTASSIUM 50 MG/1
50 TABLET ORAL 2 TIMES DAILY
Qty: 180 TABLET | Refills: 0 | Status: SHIPPED | OUTPATIENT
Start: 2022-05-27 | End: 2022-11-07

## 2022-05-27 RX ORDER — AMLODIPINE BESYLATE 2.5 MG/1
TABLET ORAL
Qty: 270 TABLET | Refills: 3 | Status: SHIPPED | OUTPATIENT
Start: 2022-05-27

## 2022-05-27 ASSESSMENT — ENCOUNTER SYMPTOMS
SHORTNESS OF BREATH: 0
MYALGIAS: 0
FREQUENCY: 0
CHILLS: 0
ABDOMINAL PAIN: 0
EYE PAIN: 0
HEMATOCHEZIA: 0
HEMATURIA: 0
WEAKNESS: 0
PARESTHESIAS: 0
DIARRHEA: 0
JOINT SWELLING: 0
HEADACHES: 1
ARTHRALGIAS: 0
SORE THROAT: 0
NERVOUS/ANXIOUS: 0
DYSURIA: 0
BREAST MASS: 0
NAUSEA: 0
CONSTIPATION: 1
FEVER: 0
HEARTBURN: 0
COUGH: 0
PALPITATIONS: 0

## 2022-05-27 ASSESSMENT — ANXIETY QUESTIONNAIRES
7. FEELING AFRAID AS IF SOMETHING AWFUL MIGHT HAPPEN: SEVERAL DAYS
GAD7 TOTAL SCORE: 6
5. BEING SO RESTLESS THAT IT IS HARD TO SIT STILL: NOT AT ALL
1. FEELING NERVOUS, ANXIOUS, OR ON EDGE: NOT AT ALL
2. NOT BEING ABLE TO STOP OR CONTROL WORRYING: MORE THAN HALF THE DAYS
8. IF YOU CHECKED OFF ANY PROBLEMS, HOW DIFFICULT HAVE THESE MADE IT FOR YOU TO DO YOUR WORK, TAKE CARE OF THINGS AT HOME, OR GET ALONG WITH OTHER PEOPLE?: SOMEWHAT DIFFICULT
6. BECOMING EASILY ANNOYED OR IRRITABLE: SEVERAL DAYS
4. TROUBLE RELAXING: NOT AT ALL
3. WORRYING TOO MUCH ABOUT DIFFERENT THINGS: MORE THAN HALF THE DAYS
GAD7 TOTAL SCORE: 6
7. FEELING AFRAID AS IF SOMETHING AWFUL MIGHT HAPPEN: SEVERAL DAYS
GAD7 TOTAL SCORE: 6

## 2022-05-27 ASSESSMENT — PATIENT HEALTH QUESTIONNAIRE - PHQ9
SUM OF ALL RESPONSES TO PHQ QUESTIONS 1-9: 2
10. IF YOU CHECKED OFF ANY PROBLEMS, HOW DIFFICULT HAVE THESE PROBLEMS MADE IT FOR YOU TO DO YOUR WORK, TAKE CARE OF THINGS AT HOME, OR GET ALONG WITH OTHER PEOPLE: SOMEWHAT DIFFICULT
SUM OF ALL RESPONSES TO PHQ QUESTIONS 1-9: 2

## 2022-05-27 ASSESSMENT — PAIN SCALES - GENERAL: PAINLEVEL: NO PAIN (0)
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
## 2022-05-27 NOTE — PATIENT INSTRUCTIONS
Labs     Keep monitoring BP occasionally  Goal under 130/80    Can increase prozac if needed for stress short term or long term     Consider shingles vaccine - at pharmacy   Preventive Health Recommendations  Female Ages 50 - 64    Yearly exam: See your health care provider every year in order to  Review health changes.   Discuss preventive care.    Review your medicines if your doctor has prescribed any.    Get a Pap test every three years (unless you have an abnormal result and your provider advises testing more often).  If you get Pap tests with HPV test, you only need to test every 5 years, unless you have an abnormal result.   You do not need a Pap test if your uterus was removed (hysterectomy) and you have not had cancer.  You should be tested each year for STDs (sexually transmitted diseases) if you're at risk.   Have a mammogram every 1 to 2 years.  Have a colonoscopy at age 50, or have a yearly FIT test (stool test). These exams screen for colon cancer.    Have a cholesterol test every 5 years, or more often if advised.  Have a diabetes test (fasting glucose) every three years. If you are at risk for diabetes, you should have this test more often.   If you are at risk for osteoporosis (brittle bone disease), think about having a bone density scan (DEXA).    Shots: Get a flu shot each year. Get a tetanus shot every 10 years.    Nutrition:   Eat at least 5 servings of fruits and vegetables each day.  Eat whole-grain bread, whole-wheat pasta and brown rice instead of white grains and rice.  Get adequate Calcium and Vitamin D.     Lifestyle  Exercise at least 150 minutes a week (30 minutes a day, 5 days a week). This will help you control your weight and prevent disease.  Limit alcohol to one drink per day.  No smoking.   Wear sunscreen to prevent skin cancer.   See your dentist every six months for an exam and cleaning.  See your eye doctor every 1 to 2 years.

## 2022-05-27 NOTE — LETTER
June 9, 2022      Mery Sullivan  [id number] [facility] [id number]        Dear ,    We are writing to inform you of your test results.           Cholesterol is pretty similar to last year.  Continue physical activity, trying to control stress (despite major stressors), maintaining your healthy weight, and trying to follow the mediterranean diet.  10 year risk for heart attack is low at 5.5%, so you do not need a statin medication at this time.     Hemoglobin is very slightly low.  Given your long history, I am not concerned.     Melissa        The 10-year ASCVD risk score (Albert TRAMMELL Jr., et al., 2013) is: 5.5%    Values used to calculate the score:      Age: 66 years      Sex: Female      Is Non- : No      Diabetic: No      Tobacco smoker: No      Systolic Blood Pressure: 118 mmHg      Is BP treated: Yes      HDL Cholesterol: 83 mg/dL      Total Cholesterol: 264 mg/dL    Resulted Orders   Lipid panel reflex to direct LDL Non-fasting   Result Value Ref Range    Cholesterol 264 (H) <200 mg/dL    Triglycerides 89 <150 mg/dL    Direct Measure HDL 83 >=50 mg/dL    LDL Cholesterol Calculated 163 (H) <=100 mg/dL    Non HDL Cholesterol 181 (H) <130 mg/dL    Patient Fasting > 8hrs? Yes     Narrative    Cholesterol  Desirable:  <200 mg/dL    Triglycerides  Normal:  Less than 150 mg/dL  Borderline High:  150-199 mg/dL  High:  200-499 mg/dL  Very High:  Greater than or equal to 500 mg/dL    Direct Measure HDL  Female:  Greater than or equal to 50 mg/dL   Male:  Greater than or equal to 40 mg/dL    LDL Cholesterol  Desirable:  <100mg/dL  Above Desirable:  100-129 mg/dL   Borderline High:  130-159 mg/dL   High:  160-189 mg/dL   Very High:  >= 190 mg/dL    Non HDL Cholesterol  Desirable:  130 mg/dL  Above Desirable:  130-159 mg/dL  Borderline High:  160-189 mg/dL  High:  190-219 mg/dL  Very High:  Greater than or equal to 220 mg/dL       If you have any questions or concerns, please call  the clinic at the number listed above.       Sincerely,      Melissa Ely PA-C

## 2022-06-01 NOTE — RESULT ENCOUNTER NOTE
Mery    Cholesterol is pretty similar to last year.  Continue physical activity, trying to control stress (despite major stressors), maintaining your healthy weight, and trying to follow the mediterranean diet.  10 year risk for heart attack is low at 5.5%, so you do not need a statin medication at this time.    Hemoglobin is very slightly low.  Given your long history, I am not concerned.    Melissa      The 10-year ASCVD risk score (Albertann TRAMMELL Jr., et al., 2013) is: 5.5%    Values used to calculate the score:      Age: 66 years      Sex: Female      Is Non- : No      Diabetic: No      Tobacco smoker: No      Systolic Blood Pressure: 118 mmHg      Is BP treated: Yes      HDL Cholesterol: 83 mg/dL      Total Cholesterol: 264 mg/dL

## 2022-06-27 DIAGNOSIS — Z86.2 HISTORY OF IRON DEFICIENCY ANEMIA: ICD-10-CM

## 2022-06-27 RX ORDER — FERROUS GLUCONATE 324(38)MG
TABLET ORAL
Qty: 180 TABLET | Refills: 4 | Status: SHIPPED | OUTPATIENT
Start: 2022-06-27

## 2022-10-17 ENCOUNTER — ALLIED HEALTH/NURSE VISIT (OUTPATIENT)
Dept: FAMILY MEDICINE | Facility: CLINIC | Age: 67
End: 2022-10-17
Payer: COMMERCIAL

## 2022-10-17 VITALS — DIASTOLIC BLOOD PRESSURE: 78 MMHG | SYSTOLIC BLOOD PRESSURE: 138 MMHG

## 2022-10-17 DIAGNOSIS — Z01.30 BLOOD PRESSURE CHECK: Primary | ICD-10-CM

## 2022-10-17 PROCEDURE — 99207 PR NO CHARGE NURSE ONLY: CPT | Performed by: PHYSICIAN ASSISTANT

## 2022-10-17 NOTE — PROGRESS NOTES
Mrey Sullivan was evaluated at [facility] on October 17, 2022 at which time her blood pressure was:    BP Readings from Last 3 Encounters:   10/17/22 138/78   05/27/22 118/70   11/12/21 (!) 140/82     Pulse Readings from Last 3 Encounters:   05/27/22 79   11/12/21 75   06/21/21 75       Reviewed lifestyle modifications for blood pressure control and reduction: including making healthy food choices, managing weight, getting regular exercise, smoking cessation, reducing alcohol consumption, monitoring blood pressure regularly.     Symptoms: None    BP Goal:< 140/90 mmHg    BP Assessment:  BP at goal    Potential Reasons for BP too high: NA - Not applicable    BP Follow-Up Plan: Recheck BP in 6 months at pharmacy    Recommendation to Provider: none at this time. Recheck in 6 months    Note completed by: Thank you,  Emani Ferrer, Pharm.D.  [facility]

## 2022-11-04 DIAGNOSIS — I10 BENIGN ESSENTIAL HYPERTENSION: ICD-10-CM

## 2022-11-07 RX ORDER — LOSARTAN POTASSIUM 50 MG/1
TABLET ORAL
Qty: 180 TABLET | Refills: 0 | Status: SHIPPED | OUTPATIENT
Start: 2022-11-07 | End: 2022-12-14

## 2022-11-19 ENCOUNTER — HEALTH MAINTENANCE LETTER (OUTPATIENT)
Age: 67
End: 2022-11-19

## 2022-12-14 DIAGNOSIS — I10 BENIGN ESSENTIAL HYPERTENSION: ICD-10-CM

## 2022-12-14 RX ORDER — LOSARTAN POTASSIUM 50 MG/1
50 TABLET ORAL 2 TIMES DAILY
Qty: 180 TABLET | Refills: 0 | Status: SHIPPED | OUTPATIENT
Start: 2022-12-14

## 2023-03-03 ENCOUNTER — HOSPITAL ENCOUNTER (OUTPATIENT)
Dept: CT IMAGING | Facility: CLINIC | Age: 68
Discharge: HOME | End: 2023-03-03
Attending: FAMILY MEDICINE | Admitting: FAMILY MEDICINE

## 2023-03-03 DIAGNOSIS — Z13.6 SCREENING FOR ISCHEMIC HEART DISEASE: ICD-10-CM

## 2023-03-03 PROCEDURE — 75571 CT HRT W/O DYE W/CA TEST: CPT

## 2023-03-03 PROCEDURE — 75571 CT HRT W/O DYE W/CA TEST: CPT | Mod: 26 | Performed by: INTERNAL MEDICINE

## 2023-04-15 NOTE — TELEPHONE ENCOUNTER
Liver enzymes remain elevated.  Will have patient hold Lipitor.  Will obtain further testing and ultrasound of the liver.      In review with patient patient would like to hold off on the ultrasound will repeat the liver enzymes in 2-3 weeks with further testing if they do remain elevated will need to move forward the ultrasound.    Patient has not had any more low blood pressure episodes.    Evette Franco CNP      Medication history complete. Medications and allergies reviewed with patient and confirmed with .

## 2023-11-01 NOTE — TELEPHONE ENCOUNTER
Reason for Call:  Procedure  has reached out to schedule diagnostic colonoscopy    Detailed comments: patient has failed to return calls    Phone Number Patient can be reached at: Home number on file [phone] (home)    Best Time: NA    Can we leave a detailed message on this number? Not Applicable    Call taken on 2/18/2019 at 1:55 PM by Zelda Naranjo     Admission

## 2024-02-17 DIAGNOSIS — Z86.2 HISTORY OF IRON DEFICIENCY ANEMIA: ICD-10-CM

## 2024-02-20 RX ORDER — FERROUS GLUCONATE 324(38)MG
324 TABLET ORAL 2 TIMES DAILY
Qty: 200 TABLET | Refills: 0 | OUTPATIENT
Start: 2024-02-20

## 2024-03-26 DIAGNOSIS — Z86.2 HISTORY OF IRON DEFICIENCY ANEMIA: ICD-10-CM

## 2024-03-26 RX ORDER — FERROUS GLUCONATE 324(38)MG
324 TABLET ORAL 2 TIMES DAILY
Qty: 200 TABLET | Refills: 0 | OUTPATIENT
Start: 2024-03-26

## 2024-04-07 ENCOUNTER — HEALTH MAINTENANCE LETTER (OUTPATIENT)
Age: 69
End: 2024-04-07

## 2024-05-02 ENCOUNTER — TELEPHONE (OUTPATIENT)
Dept: FAMILY MEDICINE | Facility: CLINIC | Age: 69
End: 2024-05-02
Payer: MEDICARE

## 2024-05-02 NOTE — TELEPHONE ENCOUNTER
Patient Quality Outreach    Patient is due for the following:   Physical Preventive Adult Physical    Next Steps:   Pt transferred care to [facility]     Type of outreach:    Chart review performed, no outreach needed.      Questions for provider review:    None           Winnie Perez CMA

## 2025-05-10 ENCOUNTER — HEALTH MAINTENANCE LETTER (OUTPATIENT)
Age: 70
End: 2025-05-10

## (undated) RX ORDER — PROPOFOL 10 MG/ML
INJECTION, EMULSION INTRAVENOUS
Status: DISPENSED
Start: 2019-02-02